# Patient Record
Sex: FEMALE | Race: WHITE | Employment: UNEMPLOYED | URBAN - METROPOLITAN AREA
[De-identification: names, ages, dates, MRNs, and addresses within clinical notes are randomized per-mention and may not be internally consistent; named-entity substitution may affect disease eponyms.]

---

## 2021-11-20 ENCOUNTER — HOSPITAL ENCOUNTER (EMERGENCY)
Age: 31
Discharge: HOME | End: 2021-11-20
Payer: COMMERCIAL

## 2021-11-20 ENCOUNTER — APPOINTMENT (OUTPATIENT)
Dept: GENERAL RADIOLOGY | Age: 31
End: 2021-11-20
Payer: COMMERCIAL

## 2021-11-20 VITALS
HEART RATE: 100 BPM | HEIGHT: 70 IN | DIASTOLIC BLOOD PRESSURE: 93 MMHG | WEIGHT: 224 LBS | TEMPERATURE: 98.3 F | OXYGEN SATURATION: 99 % | BODY MASS INDEX: 32.07 KG/M2 | RESPIRATION RATE: 18 BRPM | SYSTOLIC BLOOD PRESSURE: 134 MMHG

## 2021-11-20 DIAGNOSIS — S82.832A OTHER CLOSED FRACTURE OF DISTAL END OF LEFT FIBULA, INITIAL ENCOUNTER: Primary | ICD-10-CM

## 2021-11-20 PROCEDURE — 29515 APPLICATION SHORT LEG SPLINT: CPT

## 2021-11-20 PROCEDURE — 6370000000 HC RX 637 (ALT 250 FOR IP): Performed by: PHYSICIAN ASSISTANT

## 2021-11-20 PROCEDURE — 73610 X-RAY EXAM OF ANKLE: CPT

## 2021-11-20 PROCEDURE — 99284 EMERGENCY DEPT VISIT MOD MDM: CPT

## 2021-11-20 RX ORDER — BUPROPION HYDROCHLORIDE 150 MG/1
150 TABLET, EXTENDED RELEASE ORAL DAILY
COMMUNITY

## 2021-11-20 RX ORDER — LIDOCAINE HYDROCHLORIDE 10 MG/ML
INJECTION, SOLUTION INFILTRATION; PERINEURAL
Status: DISCONTINUED
Start: 2021-11-20 | End: 2021-11-20 | Stop reason: HOSPADM

## 2021-11-20 RX ORDER — OXYCODONE HYDROCHLORIDE AND ACETAMINOPHEN 5; 325 MG/1; MG/1
1 TABLET ORAL ONCE
Status: COMPLETED | OUTPATIENT
Start: 2021-11-20 | End: 2021-11-20

## 2021-11-20 RX ORDER — OMEPRAZOLE 40 MG/1
40 CAPSULE, DELAYED RELEASE ORAL DAILY
COMMUNITY

## 2021-11-20 RX ORDER — ONDANSETRON 4 MG/1
8 TABLET, ORALLY DISINTEGRATING ORAL ONCE
Status: COMPLETED | OUTPATIENT
Start: 2021-11-20 | End: 2021-11-20

## 2021-11-20 RX ORDER — OXYCODONE HYDROCHLORIDE AND ACETAMINOPHEN 5; 325 MG/1; MG/1
1 TABLET ORAL EVERY 6 HOURS PRN
Qty: 10 TABLET | Refills: 0 | Status: SHIPPED | OUTPATIENT
Start: 2021-11-20 | End: 2021-11-23

## 2021-11-20 RX ORDER — LIDOCAINE HYDROCHLORIDE AND EPINEPHRINE BITARTRATE 20; .01 MG/ML; MG/ML
INJECTION, SOLUTION SUBCUTANEOUS
Status: DISCONTINUED
Start: 2021-11-20 | End: 2021-11-20 | Stop reason: HOSPADM

## 2021-11-20 RX ADMIN — ONDANSETRON 8 MG: 4 TABLET, ORALLY DISINTEGRATING ORAL at 13:49

## 2021-11-20 RX ADMIN — OXYCODONE HYDROCHLORIDE AND ACETAMINOPHEN 1 TABLET: 5; 325 TABLET ORAL at 13:49

## 2021-11-20 ASSESSMENT — PAIN SCALES - GENERAL
PAINLEVEL_OUTOF10: 10
PAINLEVEL_OUTOF10: 10

## 2021-11-20 ASSESSMENT — PAIN DESCRIPTION - ORIENTATION: ORIENTATION: LEFT

## 2021-11-20 ASSESSMENT — PAIN DESCRIPTION - LOCATION: LOCATION: ANKLE

## 2021-11-20 ASSESSMENT — PAIN DESCRIPTION - PAIN TYPE: TYPE: ACUTE PAIN

## 2021-11-20 NOTE — ED NOTES
Splint applied to L ankle with EDWIN Conteh. Patient reports some pain relief after placement.      Pansy Kawasaki, RN  11/20/21 3587

## 2021-11-20 NOTE — ED PROVIDER NOTES
**ADVANCED PRACTICE PROVIDER, I HAVE EVALUATED THIS [facility]  [facility]  EMERGENCY DEPARTMENT ENCOUNTER      Pt Name: Dolores Mercado  CUB:[id number]  Mayelagffrederick 1990  Date of evaluation: 11/20/2021  Provider: Asaf Storey PA-C      Chief Complaint:    Chief Complaint   Patient presents with    Ankle Injury     Tripped over shoes and fell down 2 steps, landing on the left knee. 10 out of 10 left ankle pain that spans the entire ankle and radiates to the knee. Denies previous ortho Hx on affected leg. Nursing Notes, Past Medical Hx, Past Surgical Hx, Social Hx, Allergies, and Family Hx were all reviewed and agreed with or any disagreements were addressed in the HPI.    HPI: (Location, Duration, Timing, Severity, Quality, Assoc Sx, Context, Modifying factors)    Chief Complaint of left ankle pain    This is a  35 y.o. female who presents to the emergency department, she was brought by her friends, she states that she tripped over some shoes and fell onto steps landing on her left knee and admits to severe pain to her left ankle that radiates up to her left knee. Pain is worse with attempted range of motion and better with rest.  She rates her pain level is 10/10. This just happened prior to admission. She denies any other injury. PastMedical/Surgical History:      Diagnosis Date    Anxiety      No past surgical history on file.     Medications:  Discharge Medication List as of 11/20/2021  2:28 PM      CONTINUE these medications which have NOT CHANGED    Details   omeprazole (PRILOSEC) 40 MG delayed release capsule Take 40 mg by mouth dailyHistorical Med      metFORMIN (GLUCOPHAGE) 500 MG tablet Take 500 mg by mouth daily (with breakfast) For PCOSHistorical Med      buPROPion (WELLBUTRIN SR) 150 MG extended release tablet Take 150 mg by mouth dailyHistorical Med               Review of Systems:  (2-9 systems needed)  Review of Systems    \"Positives and Pertinent negatives as per HPI\"    Physical Exam:  Physical Exam  Vitals and nursing note reviewed. Constitutional:       Appearance: Normal appearance. She is well-developed. She is not ill-appearing or diaphoretic. HENT:      Head: Normocephalic and atraumatic. Right Ear: External ear normal.      Left Ear: External ear normal.      Nose: Nose normal.   Eyes:      General:         Right eye: No discharge. Left eye: No discharge. Pulmonary:      Effort: Pulmonary effort is normal. No respiratory distress. Breath sounds: No stridor. Musculoskeletal:      Cervical back: Normal range of motion and neck supple. Left ankle: Swelling and deformity present. Tenderness present over the lateral malleolus, medial malleolus and CF ligament. Decreased range of motion. Anterior drawer test positive. Normal pulse. Legs:    Skin:     General: Skin is warm and dry. Coloration: Skin is not pale. Neurological:      General: No focal deficit present. Mental Status: She is alert and oriented to person, place, and time. Psychiatric:         Mood and Affect: Mood normal.         Behavior: Behavior normal.         MEDICAL DECISION MAKING    Vitals:    Vitals:    11/20/21 1319 11/20/21 1320   BP:  (!) 134/93   Pulse: 100    Resp: 18    Temp: 98.3 °F (36.8 °C)    TempSrc: Oral    SpO2: 99%    Weight: 224 lb (101.6 kg)    Height: 5' 10\" (1.778 m)        LABS:Labs Reviewed - No data to display     Remainder of labs reviewed and were negative at this time or not returned at the time of this note. RADIOLOGY:   Non-plain film images such as CT, Ultrasound and MRI are read by the radiologist. Kathy Borjas PA-C have directly visualized the radiologic plain film image(s) with the below findings:      Interpretation per the Radiologist below, if available at the time of this note:    XR ANKLE LEFT (MIN 3 VIEWS)   Final Result   Distal fibular fracture with minimal displacement.       Widening of the medial aspect of the ankle mortise, possible ligamentous   injury. XR ANKLE LEFT (MIN 3 VIEWS)    Result Date: 11/20/2021  EXAMINATION: 4 XRAY VIEWS OF THE LEFT ANKLE 11/20/2021 2:04 pm COMPARISON: None. HISTORY: ORDERING SYSTEM PROVIDED HISTORY: injury TECHNOLOGIST PROVIDED HISTORY: Reason for exam:->injury Reason for Exam: lateral ankle pain s/p fall Acuity: Acute Type of Exam: Initial FINDINGS: A fiberglass cast is present. There is widening of the medial space of the ankle mortise. There is a spiral fracture of the distal 3rd of the fibular shaft. The distal segment is displaced laterally by 2 mm. Distal fibular fracture with minimal displacement. Widening of the medial aspect of the ankle mortise, possible ligamentous injury. MEDICAL DECISION MAKING / ED COURSE:      PROCEDURES:   Ortho Injury    Date/Time: 11/20/2021 2:59 PM  Performed by: Berkley Mix PA-C  Authorized by: Berkley Mix PA-C   Consent: Verbal consent obtained. Risks and benefits: risks, benefits and alternatives were discussed  Consent given by: patient  Patient identity confirmed: verbally with patient  Injury location: ankle  Location details: left ankle  Injury type: dislocation  Dislocation type: lateral  Pre-procedure neurovascular assessment: neurovascularly intact  Pre-procedure distal perfusion: normal  Pre-procedure neurological function: normal  Pre-procedure range of motion: reduced    Anesthesia:  Local anesthesia used: no    Sedation:  Patient sedated: no    Immobilization: splint  Splint type: Posterior OCL and stirrup. Supplies used: Ortho-Glass  Post-procedure neurovascular assessment: post-procedure neurovascularly intact  Post-procedure distal perfusion: normal  Post-procedure neurological function: normal  Post-procedure range of motion: improved  Patient tolerance: Patient tolerated the procedure well with no immediate complications  Comments:  This patient had a grossly unstable ankle with dislocation, she was immediately splinted manually by myself and then with Ortho-Glass, and then we obtained an x-ray. The patient had a dislocation fracture of left ankle. A posterior and stirrup OCL splint was placed by the myself and the nurse, it was applied appropriately and the patient was neurovascularly intact as observed by myself. Patient was given:  Medications   lidocaine-EPINEPHrine 2 percent-[id number] injection (has no administration in time range)   lidocaine 1 % injection (has no administration in time range)   oxyCODONE-acetaminophen (PERCOCET) 5-325 MG per tablet 1 tablet (1 tablet Oral Given 11/20/21 1349)   ondansetron (ZOFRAN-ODT) disintegrating tablet 8 mg (8 mg Oral Given 11/20/21 1349)       This patient was brought in by private car, she was in a wheelchair, because of the position of where she was we elected to splint this patient prior to x-ray. Reevaluation is reassuring, the patient stated that she felt much better after the splint. No evidence of nervous damage, she did state that she had initially a little bit of numbness which is gone away. The patient tolerated their visit well. I evaluated the patient. The physician was available for consultation as needed. The patient and / or the family were informed of the results of any tests, a time was given to answer questions, a plan was proposed and they agreed with plan. CLINICAL IMPRESSION:  1.  Other closed fracture of distal end of left fibula, initial encounter        DISPOSITION Decision To Discharge 11/20/2021 02:40:04 PM      PATIENT REFERRED TO:  Romana Patrick MD  [address] [address]  [phone]    Call in 2 days  For a recheck in 2-7 days      DISCHARGE MEDICATIONS:  Discharge Medication List as of 11/20/2021  2:28 PM      START taking these medications    Details   oxyCODONE-acetaminophen (PERCOCET) 5-325 MG per tablet Take 1 tablet by mouth every 6 hours as needed for Pain for up to 3 days. , Disp-10 tablet, R-0Print             DISCONTINUED MEDICATIONS:  Discharge Medication List as of 11/20/2021  2:28 PM          Periodic Controlled Substance Monitoring: No signs of potential drug abuse or diversion identified.  Mike Meier PA-C)      (Please note the MDM and HPI sections of this note were completed with a voice recognition program.  Efforts were made to edit the dictations but occasionally words are mis-transcribed.)    Electronically signed, Mike Meier PA-C,          Mike Meier PA-C  11/20/21 1604

## 2021-11-22 ENCOUNTER — HOSPITAL ENCOUNTER (OUTPATIENT)
Age: 31
Setting detail: OUTPATIENT SURGERY
Discharge: HOME | End: 2021-11-22
Attending: ORTHOPAEDIC SURGERY | Admitting: ORTHOPAEDIC SURGERY
Payer: COMMERCIAL

## 2021-11-22 ENCOUNTER — APPOINTMENT (OUTPATIENT)
Dept: GENERAL RADIOLOGY | Age: 31
End: 2021-11-22
Attending: ORTHOPAEDIC SURGERY
Payer: COMMERCIAL

## 2021-11-22 ENCOUNTER — ANESTHESIA (OUTPATIENT)
Dept: OPERATING ROOM | Age: 31
End: 2021-11-22
Payer: COMMERCIAL

## 2021-11-22 ENCOUNTER — ANESTHESIA EVENT (OUTPATIENT)
Dept: OPERATING ROOM | Age: 31
End: 2021-11-22
Payer: COMMERCIAL

## 2021-11-22 VITALS
DIASTOLIC BLOOD PRESSURE: 86 MMHG | OXYGEN SATURATION: 96 % | TEMPERATURE: 98.5 F | HEIGHT: 70 IN | WEIGHT: 224 LBS | SYSTOLIC BLOOD PRESSURE: 137 MMHG | HEART RATE: 89 BPM | RESPIRATION RATE: 14 BRPM | BODY MASS INDEX: 32.07 KG/M2

## 2021-11-22 VITALS
SYSTOLIC BLOOD PRESSURE: 120 MMHG | DIASTOLIC BLOOD PRESSURE: 59 MMHG | TEMPERATURE: 97.7 F | OXYGEN SATURATION: 97 % | RESPIRATION RATE: 2 BRPM

## 2021-11-22 DIAGNOSIS — S82.842A BIMALLEOLAR FRACTURE, LEFT, CLOSED, INITIAL ENCOUNTER: Primary | ICD-10-CM

## 2021-11-22 LAB
GONADOTROPIN, CHORIONIC (HCG) QUANT: <5 MIU/ML
HCG QUALITATIVE: NEGATIVE
SARS-COV-2, NAAT: NOT DETECTED

## 2021-11-22 PROCEDURE — 7100000010 HC PHASE II RECOVERY - FIRST 15 MIN: Performed by: ORTHOPAEDIC SURGERY

## 2021-11-22 PROCEDURE — 3600000004 HC SURGERY LEVEL 4 BASE: Performed by: ORTHOPAEDIC SURGERY

## 2021-11-22 PROCEDURE — 3600000014 HC SURGERY LEVEL 4 ADDTL 15MIN: Performed by: ORTHOPAEDIC SURGERY

## 2021-11-22 PROCEDURE — 6360000002 HC RX W HCPCS: Performed by: NURSE ANESTHETIST, CERTIFIED REGISTERED

## 2021-11-22 PROCEDURE — 6370000000 HC RX 637 (ALT 250 FOR IP): Performed by: STUDENT IN AN ORGANIZED HEALTH CARE EDUCATION/TRAINING PROGRAM

## 2021-11-22 PROCEDURE — 84703 CHORIONIC GONADOTROPIN ASSAY: CPT

## 2021-11-22 PROCEDURE — 7100000000 HC PACU RECOVERY - FIRST 15 MIN: Performed by: ORTHOPAEDIC SURGERY

## 2021-11-22 PROCEDURE — 3700000000 HC ANESTHESIA ATTENDED CARE: Performed by: ORTHOPAEDIC SURGERY

## 2021-11-22 PROCEDURE — 2500000003 HC RX 250 WO HCPCS: Performed by: NURSE ANESTHETIST, CERTIFIED REGISTERED

## 2021-11-22 PROCEDURE — 27829 TREAT LOWER LEG JOINT: CPT | Performed by: NURSE PRACTITIONER

## 2021-11-22 PROCEDURE — 7100000011 HC PHASE II RECOVERY - ADDTL 15 MIN: Performed by: ORTHOPAEDIC SURGERY

## 2021-11-22 PROCEDURE — 6360000002 HC RX W HCPCS: Performed by: STUDENT IN AN ORGANIZED HEALTH CARE EDUCATION/TRAINING PROGRAM

## 2021-11-22 PROCEDURE — 2500000003 HC RX 250 WO HCPCS: Performed by: ORTHOPAEDIC SURGERY

## 2021-11-22 PROCEDURE — 2580000003 HC RX 258: Performed by: ORTHOPAEDIC SURGERY

## 2021-11-22 PROCEDURE — 27848 TREAT ANKLE DISLOCATION: CPT | Performed by: NURSE PRACTITIONER

## 2021-11-22 PROCEDURE — C1713 ANCHOR/SCREW BN/BN,TIS/BN: HCPCS | Performed by: ORTHOPAEDIC SURGERY

## 2021-11-22 PROCEDURE — 3209999900 FLUORO FOR SURGICAL PROCEDURES

## 2021-11-22 PROCEDURE — 27829 TREAT LOWER LEG JOINT: CPT | Performed by: ORTHOPAEDIC SURGERY

## 2021-11-22 PROCEDURE — 2580000003 HC RX 258: Performed by: STUDENT IN AN ORGANIZED HEALTH CARE EDUCATION/TRAINING PROGRAM

## 2021-11-22 PROCEDURE — 27848 TREAT ANKLE DISLOCATION: CPT | Performed by: ORTHOPAEDIC SURGERY

## 2021-11-22 PROCEDURE — 2500000003 HC RX 250 WO HCPCS: Performed by: STUDENT IN AN ORGANIZED HEALTH CARE EDUCATION/TRAINING PROGRAM

## 2021-11-22 PROCEDURE — 87635 SARS-COV-2 COVID-19 AMP PRB: CPT

## 2021-11-22 PROCEDURE — 6360000002 HC RX W HCPCS: Performed by: ORTHOPAEDIC SURGERY

## 2021-11-22 PROCEDURE — 99219 PR INITIAL OBSERVATION CARE/DAY 50 MINUTES: CPT | Performed by: ORTHOPAEDIC SURGERY

## 2021-11-22 PROCEDURE — 3700000001 HC ADD 15 MINUTES (ANESTHESIA): Performed by: ORTHOPAEDIC SURGERY

## 2021-11-22 PROCEDURE — 2709999900 HC NON-CHARGEABLE SUPPLY: Performed by: ORTHOPAEDIC SURGERY

## 2021-11-22 PROCEDURE — 7100000001 HC PACU RECOVERY - ADDTL 15 MIN: Performed by: ORTHOPAEDIC SURGERY

## 2021-11-22 PROCEDURE — 84702 CHORIONIC GONADOTROPIN TEST: CPT

## 2021-11-22 PROCEDURE — 73600 X-RAY EXAM OF ANKLE: CPT

## 2021-11-22 DEVICE — SCREW BNE L14MM DIA3.5MM LNG CORT S STL ST NONCANNULATED: Type: IMPLANTABLE DEVICE | Site: ANKLE | Status: FUNCTIONAL

## 2021-11-22 DEVICE — SCREW BNE L16MM DIA2.7MM LNG CORT FT ANK S STL ST: Type: IMPLANTABLE DEVICE | Site: ANKLE | Status: FUNCTIONAL

## 2021-11-22 DEVICE — IMPLANTABLE DEVICE
Type: IMPLANTABLE DEVICE | Site: ANKLE | Status: NON-FUNCTIONAL
Removed: 2022-04-07

## 2021-11-22 DEVICE — SCREW BNE UNIV L12MM DIA3.5MM CORT S STL ST NONCANNULATED: Type: IMPLANTABLE DEVICE | Site: ANKLE | Status: FUNCTIONAL

## 2021-11-22 DEVICE — PLATE BNE L97MM 8 H BILAT S STL 1/3 TBLR FOR 3.5MM SCR: Type: IMPLANTABLE DEVICE | Site: ANKLE | Status: FUNCTIONAL

## 2021-11-22 DEVICE — SCREW BNE L14MM DIA2.7MM SM HEX HD DIA2.5MM CORT S STL ST: Type: IMPLANTABLE DEVICE | Site: ANKLE | Status: FUNCTIONAL

## 2021-11-22 RX ORDER — LIDOCAINE HYDROCHLORIDE 20 MG/ML
INJECTION, SOLUTION EPIDURAL; INFILTRATION; INTRACAUDAL; PERINEURAL PRN
Status: DISCONTINUED | OUTPATIENT
Start: 2021-11-22 | End: 2021-11-22 | Stop reason: SDUPTHER

## 2021-11-22 RX ORDER — PROPOFOL 10 MG/ML
INJECTION, EMULSION INTRAVENOUS PRN
Status: DISCONTINUED | OUTPATIENT
Start: 2021-11-22 | End: 2021-11-22 | Stop reason: SDUPTHER

## 2021-11-22 RX ORDER — MAGNESIUM HYDROXIDE 1200 MG/15ML
LIQUID ORAL CONTINUOUS PRN
Status: COMPLETED | OUTPATIENT
Start: 2021-11-22 | End: 2021-11-22

## 2021-11-22 RX ORDER — LABETALOL HYDROCHLORIDE 5 MG/ML
5 INJECTION, SOLUTION INTRAVENOUS EVERY 10 MIN PRN
Status: DISCONTINUED | OUTPATIENT
Start: 2021-11-22 | End: 2021-11-22 | Stop reason: HOSPADM

## 2021-11-22 RX ORDER — DIPHENHYDRAMINE HYDROCHLORIDE 50 MG/ML
12.5 INJECTION INTRAMUSCULAR; INTRAVENOUS
Status: COMPLETED | OUTPATIENT
Start: 2021-11-22 | End: 2021-11-22

## 2021-11-22 RX ORDER — SCOLOPAMINE TRANSDERMAL SYSTEM 1 MG/1
1 PATCH, EXTENDED RELEASE TRANSDERMAL ONCE
Status: DISCONTINUED | OUTPATIENT
Start: 2021-11-22 | End: 2021-11-22 | Stop reason: HOSPADM

## 2021-11-22 RX ORDER — HYDRALAZINE HYDROCHLORIDE 20 MG/ML
5 INJECTION INTRAMUSCULAR; INTRAVENOUS EVERY 10 MIN PRN
Status: DISCONTINUED | OUTPATIENT
Start: 2021-11-22 | End: 2021-11-22 | Stop reason: HOSPADM

## 2021-11-22 RX ORDER — CEPHALEXIN 500 MG/1
500 CAPSULE ORAL 4 TIMES DAILY
Qty: 20 CAPSULE | Refills: 0 | Status: SHIPPED | OUTPATIENT
Start: 2021-11-22 | End: 2021-11-27

## 2021-11-22 RX ORDER — BUPIVACAINE HYDROCHLORIDE 5 MG/ML
INJECTION, SOLUTION EPIDURAL; INTRACAUDAL
Status: COMPLETED | OUTPATIENT
Start: 2021-11-22 | End: 2021-11-22

## 2021-11-22 RX ORDER — HYDROCODONE BITARTRATE AND ACETAMINOPHEN 5; 325 MG/1; MG/1
1 TABLET ORAL PRN
Status: DISCONTINUED | OUTPATIENT
Start: 2021-11-22 | End: 2021-11-22 | Stop reason: HOSPADM

## 2021-11-22 RX ORDER — KETOROLAC TROMETHAMINE 30 MG/ML
15 INJECTION, SOLUTION INTRAMUSCULAR; INTRAVENOUS ONCE
Status: DISCONTINUED | OUTPATIENT
Start: 2021-11-22 | End: 2021-11-22

## 2021-11-22 RX ORDER — SODIUM CHLORIDE 9 MG/ML
INJECTION, SOLUTION INTRAVENOUS CONTINUOUS
Status: DISCONTINUED | OUTPATIENT
Start: 2021-11-22 | End: 2021-11-22 | Stop reason: HOSPADM

## 2021-11-22 RX ORDER — MIDAZOLAM HYDROCHLORIDE 1 MG/ML
INJECTION INTRAMUSCULAR; INTRAVENOUS PRN
Status: DISCONTINUED | OUTPATIENT
Start: 2021-11-22 | End: 2021-11-22 | Stop reason: SDUPTHER

## 2021-11-22 RX ORDER — PROCHLORPERAZINE EDISYLATE 5 MG/ML
5 INJECTION INTRAMUSCULAR; INTRAVENOUS
Status: DISCONTINUED | OUTPATIENT
Start: 2021-11-22 | End: 2021-11-22 | Stop reason: HOSPADM

## 2021-11-22 RX ORDER — KETOROLAC TROMETHAMINE 30 MG/ML
15 INJECTION, SOLUTION INTRAMUSCULAR; INTRAVENOUS ONCE
Status: COMPLETED | OUTPATIENT
Start: 2021-11-22 | End: 2021-11-22

## 2021-11-22 RX ORDER — SCOLOPAMINE TRANSDERMAL SYSTEM 1 MG/1
1 PATCH, EXTENDED RELEASE TRANSDERMAL ONCE
Status: DISCONTINUED | OUTPATIENT
Start: 2021-11-22 | End: 2021-11-22 | Stop reason: SDUPTHER

## 2021-11-22 RX ORDER — ONDANSETRON 2 MG/ML
INJECTION INTRAMUSCULAR; INTRAVENOUS PRN
Status: DISCONTINUED | OUTPATIENT
Start: 2021-11-22 | End: 2021-11-22 | Stop reason: SDUPTHER

## 2021-11-22 RX ORDER — HYDROCODONE BITARTRATE AND ACETAMINOPHEN 5; 325 MG/1; MG/1
2 TABLET ORAL PRN
Status: DISCONTINUED | OUTPATIENT
Start: 2021-11-22 | End: 2021-11-22 | Stop reason: HOSPADM

## 2021-11-22 RX ORDER — SODIUM CHLORIDE 0.9 % (FLUSH) 0.9 %
5-40 SYRINGE (ML) INJECTION EVERY 12 HOURS SCHEDULED
Status: DISCONTINUED | OUTPATIENT
Start: 2021-11-22 | End: 2021-11-22 | Stop reason: HOSPADM

## 2021-11-22 RX ORDER — DEXAMETHASONE SODIUM PHOSPHATE 4 MG/ML
INJECTION, SOLUTION INTRA-ARTICULAR; INTRALESIONAL; INTRAMUSCULAR; INTRAVENOUS; SOFT TISSUE PRN
Status: DISCONTINUED | OUTPATIENT
Start: 2021-11-22 | End: 2021-11-22 | Stop reason: SDUPTHER

## 2021-11-22 RX ORDER — SODIUM CHLORIDE 9 MG/ML
25 INJECTION, SOLUTION INTRAVENOUS PRN
Status: DISCONTINUED | OUTPATIENT
Start: 2021-11-22 | End: 2021-11-22 | Stop reason: HOSPADM

## 2021-11-22 RX ORDER — APREPITANT 40 MG/1
40 CAPSULE ORAL ONCE
Status: COMPLETED | OUTPATIENT
Start: 2021-11-22 | End: 2021-11-22

## 2021-11-22 RX ORDER — FENTANYL CITRATE 50 UG/ML
INJECTION, SOLUTION INTRAMUSCULAR; INTRAVENOUS PRN
Status: DISCONTINUED | OUTPATIENT
Start: 2021-11-22 | End: 2021-11-22 | Stop reason: SDUPTHER

## 2021-11-22 RX ORDER — SODIUM CHLORIDE 0.9 % (FLUSH) 0.9 %
5-40 SYRINGE (ML) INJECTION PRN
Status: DISCONTINUED | OUTPATIENT
Start: 2021-11-22 | End: 2021-11-22 | Stop reason: HOSPADM

## 2021-11-22 RX ORDER — HYDROCODONE BITARTRATE AND ACETAMINOPHEN 5; 325 MG/1; MG/1
1 TABLET ORAL EVERY 6 HOURS PRN
Qty: 20 TABLET | Refills: 0 | Status: SHIPPED | OUTPATIENT
Start: 2021-11-22 | End: 2021-11-27

## 2021-11-22 RX ORDER — ONDANSETRON 2 MG/ML
4 INJECTION INTRAMUSCULAR; INTRAVENOUS
Status: COMPLETED | OUTPATIENT
Start: 2021-11-22 | End: 2021-11-22

## 2021-11-22 RX ADMIN — HYDROMORPHONE HYDROCHLORIDE 1 MG: 1 INJECTION, SOLUTION INTRAMUSCULAR; INTRAVENOUS; SUBCUTANEOUS at 13:17

## 2021-11-22 RX ADMIN — HYDROMORPHONE HYDROCHLORIDE 0.5 MG: 1 INJECTION, SOLUTION INTRAMUSCULAR; INTRAVENOUS; SUBCUTANEOUS at 14:32

## 2021-11-22 RX ADMIN — LABETALOL HYDROCHLORIDE 5 MG: 5 INJECTION INTRAVENOUS at 14:56

## 2021-11-22 RX ADMIN — SODIUM CHLORIDE: 9 INJECTION, SOLUTION INTRAVENOUS at 12:22

## 2021-11-22 RX ADMIN — ONDANSETRON 4 MG: 2 INJECTION INTRAMUSCULAR; INTRAVENOUS at 14:35

## 2021-11-22 RX ADMIN — ONDANSETRON 4 MG: 2 INJECTION INTRAMUSCULAR; INTRAVENOUS at 13:06

## 2021-11-22 RX ADMIN — DIPHENHYDRAMINE HYDROCHLORIDE 12.5 MG: 50 INJECTION, SOLUTION INTRAMUSCULAR; INTRAVENOUS at 15:54

## 2021-11-22 RX ADMIN — HYDROMORPHONE HYDROCHLORIDE 0.5 MG: 1 INJECTION, SOLUTION INTRAMUSCULAR; INTRAVENOUS; SUBCUTANEOUS at 14:59

## 2021-11-22 RX ADMIN — MIDAZOLAM 2 MG: 1 INJECTION INTRAMUSCULAR; INTRAVENOUS at 13:02

## 2021-11-22 RX ADMIN — FENTANYL CITRATE 50 MCG: 50 INJECTION INTRAMUSCULAR; INTRAVENOUS at 13:06

## 2021-11-22 RX ADMIN — CEFAZOLIN 2000 MG: 10 INJECTION, POWDER, FOR SOLUTION INTRAVENOUS at 13:02

## 2021-11-22 RX ADMIN — PROPOFOL 200 MG: 10 INJECTION, EMULSION INTRAVENOUS at 13:06

## 2021-11-22 RX ADMIN — APREPITANT 40 MG: 40 CAPSULE ORAL at 12:20

## 2021-11-22 RX ADMIN — FENTANYL CITRATE 50 MCG: 50 INJECTION INTRAMUSCULAR; INTRAVENOUS at 13:15

## 2021-11-22 RX ADMIN — HYDROMORPHONE HYDROCHLORIDE 0.5 MG: 1 INJECTION, SOLUTION INTRAMUSCULAR; INTRAVENOUS; SUBCUTANEOUS at 14:49

## 2021-11-22 RX ADMIN — DEXAMETHASONE SODIUM PHOSPHATE 10 MG: 4 INJECTION, SOLUTION INTRAMUSCULAR; INTRAVENOUS at 13:06

## 2021-11-22 RX ADMIN — KETOROLAC TROMETHAMINE 15 MG: 30 INJECTION, SOLUTION INTRAMUSCULAR at 15:26

## 2021-11-22 RX ADMIN — LIDOCAINE HYDROCHLORIDE 100 MG: 20 INJECTION, SOLUTION EPIDURAL; INFILTRATION; INTRACAUDAL; PERINEURAL at 13:06

## 2021-11-22 RX ADMIN — PROPOFOL 50 MG: 10 INJECTION, EMULSION INTRAVENOUS at 13:09

## 2021-11-22 ASSESSMENT — PULMONARY FUNCTION TESTS
PIF_VALUE: 10
PIF_VALUE: 9
PIF_VALUE: 10
PIF_VALUE: 9
PIF_VALUE: 11
PIF_VALUE: 24
PIF_VALUE: 0
PIF_VALUE: 10
PIF_VALUE: 11
PIF_VALUE: 9
PIF_VALUE: 10
PIF_VALUE: 11
PIF_VALUE: 10
PIF_VALUE: 10
PIF_VALUE: 9
PIF_VALUE: 10
PIF_VALUE: 11
PIF_VALUE: 10
PIF_VALUE: 10
PIF_VALUE: 2
PIF_VALUE: 1
PIF_VALUE: 10
PIF_VALUE: 10
PIF_VALUE: 9
PIF_VALUE: 8
PIF_VALUE: 1
PIF_VALUE: 10
PIF_VALUE: 10
PIF_VALUE: 1
PIF_VALUE: 5
PIF_VALUE: 10
PIF_VALUE: 4
PIF_VALUE: 10
PIF_VALUE: 8
PIF_VALUE: 10
PIF_VALUE: 9
PIF_VALUE: 10
PIF_VALUE: 5
PIF_VALUE: 4
PIF_VALUE: 2
PIF_VALUE: 10
PIF_VALUE: 10
PIF_VALUE: 4
PIF_VALUE: 10
PIF_VALUE: 9
PIF_VALUE: 0
PIF_VALUE: 10
PIF_VALUE: 4
PIF_VALUE: 10
PIF_VALUE: 10
PIF_VALUE: 6
PIF_VALUE: 10
PIF_VALUE: 4
PIF_VALUE: 1
PIF_VALUE: 4
PIF_VALUE: 9
PIF_VALUE: 10

## 2021-11-22 ASSESSMENT — PAIN DESCRIPTION - ORIENTATION
ORIENTATION: LEFT

## 2021-11-22 ASSESSMENT — PAIN DESCRIPTION - FREQUENCY
FREQUENCY: CONTINUOUS

## 2021-11-22 ASSESSMENT — PAIN DESCRIPTION - PROGRESSION
CLINICAL_PROGRESSION: GRADUALLY IMPROVING
CLINICAL_PROGRESSION: GRADUALLY IMPROVING
CLINICAL_PROGRESSION: NOT CHANGED

## 2021-11-22 ASSESSMENT — PAIN SCALES - GENERAL
PAINLEVEL_OUTOF10: 8
PAINLEVEL_OUTOF10: 6
PAINLEVEL_OUTOF10: 8
PAINLEVEL_OUTOF10: 4
PAINLEVEL_OUTOF10: 8
PAINLEVEL_OUTOF10: 4

## 2021-11-22 ASSESSMENT — PAIN DESCRIPTION - ONSET
ONSET: ON-GOING

## 2021-11-22 ASSESSMENT — PAIN DESCRIPTION - DESCRIPTORS
DESCRIPTORS: BURNING
DESCRIPTORS: ACHING;BURNING
DESCRIPTORS: BURNING

## 2021-11-22 ASSESSMENT — PAIN DESCRIPTION - LOCATION
LOCATION: ANKLE
LOCATION: FOOT
LOCATION: FOOT
LOCATION: ANKLE
LOCATION: FOOT

## 2021-11-22 ASSESSMENT — LIFESTYLE VARIABLES: SMOKING_STATUS: 0

## 2021-11-22 ASSESSMENT — PAIN DESCRIPTION - PAIN TYPE
TYPE: ACUTE PAIN;SURGICAL PAIN
TYPE: ACUTE PAIN;SURGICAL PAIN
TYPE: SURGICAL PAIN

## 2021-11-22 ASSESSMENT — PAIN - FUNCTIONAL ASSESSMENT
PAIN_FUNCTIONAL_ASSESSMENT: PREVENTS OR INTERFERES SOME ACTIVE ACTIVITIES AND ADLS
PAIN_FUNCTIONAL_ASSESSMENT: 0-10

## 2021-11-22 NOTE — BRIEF OP NOTE
Brief Postoperative Note      Patient: Cherri Hermosillo  YOB: 1990  MRN: [id number]    Date of Procedure: 11/22/2021    Pre-Op Diagnosis: LEFT ANKLE FRACTURE DISLOCATION WITH SYNDESMOSIS DISRUPTION. Post-Op Diagnosis: Same       Procedure(s):  OPEN REDUCTION INTERNAL FIXATION LEFT ANKLE FRACTURE DISLOCATION WITH SYNDESMOSIS REPAIR. Surgeon(s):  Gracie Arellano MD    Assistant: Gudelia Dennis CNP    Anesthesia: General    Estimated Blood Loss (mL): Minimal    Complications: None    Specimens:   * No specimens in log *    Implants:  Implant Name Type Inv. Item Serial No.  Lot No. LRB No. Used Action   SCREW BNE L14MM DIA2.7MM SM HEX HD DIA2.5MM ZOILA S STL ST - [id number]  SCREW BNE L14MM DIA2.7MM SM HEX HD DIA2.5MM ZOILA S STL ST [id number] [employer]HA [employer]WD  Left 2 Implanted   SCREW BNE L16MM DIA2. 7MM LNG ZOILA FT ANK S STL ST - [id number]  SCREW BNE L16MM DIA2. 7MM LNG ZOILA FT ANK S STL ST [id number] [employer]HA [employer]WD  Left 1 Implanted   SCREW BNE UNIV L12MM DIA3.5MM ZOILA S STL ST NONCANNULATED - [id number]  SCREW BNE UNIV L12MM DIA3.5MM ZOILA S STL ST NONCANNULATED [id number] [employer]WD  Left 3 Implanted   SCREW BNE L14MM DIA3. 5MM LNG ZOILA S STL ST NONCANNULATED - [id number]  SCREW BNE L14MM DIA3. 5MM LNG ZOILA S STL ST NONCANNULATED [id number] [employer]-WD  Left 3 Implanted   PLATE BNE O25PD 8 H BILAT S STL 1/3 TBLR FOR 3.5MM SCR - [id number]  PLATE BNE B75OE 8 H BILAT S STL 1/3 TBLR FOR 3.5MM SCR [id number] [employer]WD  Left 1 Implanted   SCREW BNE L45MM DIA3. 5MM SM HEX HD DIA2.5MM ZOILA S STL ST - [id number]  SCREW BNE L45MM DIA3. 5MM SM HEX HD DIA2.5MM ZOILA S STL ST [id number] [employer]WD  Left 1 Implanted         Drains: * No LDAs found *    Findings: Same    Electronically signed by Gracie Arellano MD on 11/22/2021 at 5:44 PM

## 2021-11-22 NOTE — PROGRESS NOTES
To pacu from OR. PT asleep with oral airway in place. Dressing to left lower leg dry and intact. Popliteal pulse palpable. Toes to left foot warm with brisk cap refill. IV infusing. Monitor in sinus rhythm.

## 2021-11-22 NOTE — H&P
Preoperative H&P Update    The patient's History and Physical in the medical record from 11/22/2021 was reviewed by me today. Past Medical History:   Diagnosis Date    Anxiety      No past surgical history on file. No current facility-administered medications on file prior to encounter. Current Outpatient Medications on File Prior to Encounter   Medication Sig Dispense Refill    omeprazole (PRILOSEC) 40 MG delayed release capsule Take 40 mg by mouth daily      metFORMIN (GLUCOPHAGE) 500 MG tablet Take 500 mg by mouth daily (with breakfast) For PCOS      buPROPion (WELLBUTRIN SR) 150 MG extended release tablet Take 150 mg by mouth daily      oxyCODONE-acetaminophen (PERCOCET) 5-325 MG per tablet Take 1 tablet by mouth every 6 hours as needed for Pain for up to 3 days. 10 tablet 0       No Known Allergies   I reviewed the HPI, medications, allergies, reason for surgery, diagnosis and treatment plan and there has been no change. The patient was evaluated by me today. Physical exam findings for this update include: There were no vitals filed for this visit. Airway is intact  Chest: chest clear, no wheezing, rales, normal symmetric air entry, no tachypnea, retractions or cyanosis  Heart: regular rate and rhythm ; heart sounds normal  Findings on exam of the body region where surgery is to be performed include:  Left ankle bimalleolar fracture.     Electronically signed by Michel Wayne MD on 11/22/2021 at 11:10 AM

## 2021-11-22 NOTE — PROGRESS NOTES
Pt asleep. Wakes easily. States pain 4/10 and tolerable and that itching has eased. Waiting for room in phase 2.

## 2021-11-22 NOTE — CONSULTS
[facility] Orthopedic Surgery  Consult Note         This patient is seen in consultation at the request of Rocio Jeong PA-C    Reason for Consult:  Left ankle pain / fracture dislocation with syndesmosis disruption. CHIEF COMPLAINT:  Left ankle pain / fall. History Obtained From:  patient, spouse, electronic medical record    HISTORY OF PRESENT ILLNESS:    Ms. Mary Foster 35 y.o.  female seen today for consultation and evaluation of a left ankle injury which occurred when she was walking down a step and tripped over a shoe at her friend's house. She was first seen and evaluated in [facility], where she was reduced then x-rayed, splinted and asked to f/u with me. She is complaining of medial & lateral ankle pain and swelling. This is better with elevation and worse with bearing any wt. The pain is sharp and not radiating. No numbness or tingling sensation. Alleviating factors: elevation and rest. No other complaint. Past Medical History:        Diagnosis Date    Anxiety        Past Surgical History:        Procedure Laterality Date    ANKLE FRACTURE SURGERY Left 11/22/2021    OPEN REDUCTION INTERNAL FIXATION LEFT ANKLE performed by Leopold Grant, MD at [place] [address]       Medications prior to admission:   Prior to Admission medications    Medication Sig Start Date End Date Taking? Authorizing Provider   omeprazole (PRILOSEC) 40 MG delayed release capsule Take 40 mg by mouth daily    Historical Provider, MD   metFORMIN (GLUCOPHAGE) 500 MG tablet Take 500 mg by mouth daily (with breakfast) For PCOS    Historical Provider, MD   buPROPion (WELLBUTRIN SR) 150 MG extended release tablet Take 150 mg by mouth daily    Historical Provider, MD   oxyCODONE-acetaminophen (PERCOCET) 5-325 MG per tablet Take 1 tablet by mouth every 6 hours as needed for Pain for up to 3 days. 11/20/21 11/23/21  Rocio Jeong PA-C       Current Medications:   No current facility-administered medications for this encounter.     Allergies: CONSTITUTIONAL: Denies unexplained weight loss, fevers, chills or fatigue  NEUROLOGICAL: Denies unsteady gait or progressive weakness    PSYCHOLOGICAL: Denies anxiety, depression   SKIN: Denies skin changes, delayed healing, rash, itching   HEMATOLOGIC: Denies easy bleeding or bruising  ENDOCRINE: Denies excessive thirst, urination, heat/cold  RESPIRATORY: Denies current dyspnea, cough  CARDIOVASCULAR: Negative for chest pain at this time. EYES: Negative for photophobia and visual disturbance. ENT:  Negative for rhinorrhea, epistaxis, sore throat, or hearing loss. GI: Denies nausea, vomiting, diarrhea   : Denies bowel or bladder issues   MUSCULOSKELETAL: Left ankle pain. All other ROS reviewed in chart or with patient or family and are grossly negative. PHYSICAL EXAMINATION:  Ms. Mary Foster is a very pleasant 35 y.o. female who seen today in no acute distress, awake, alert, and oriented. She is well nourished and groomed. Patient with normal affect. There is no height or weight on file to calculate BMI. . Skin warm and dry. Resting respiratory rate is 16. Resp deep and easy. Pulse is with regular rate and rhythm    There were no vitals taken for this visit. Airway is intact  Chest: chest clear, no wheezing, rales, normal symmetric air entry, no tachypnea, retractions or cyanosis  Heart: regular rate and rhythm ; heart sounds normal   Hearing intact, pupil equal and reactive bilateral  Lymphatics; No groin or axillary enlarged lymph nodes. Neck; No swelling  Abdomen; soft, non distended. MUSCULOSKELETAL:   Examination of both ankles showing a decreased range of motion of the left ankle compare to the other side. There is moderate swelling that can be seen, as well as ecchymosis. She has intact sensation and good pedal pulses. She has significant tenderness on deep palpation over the medial & lateral malleolus of the left ankle.     NEUROLOGIC:   Sensory:    Touch: Right Upper Extremity:  normal                   Left Upper Extremity:  normal                  Right Lower Extremity:  normal                  Left Lower Extremity:  normal        DATA:    CBC: No results found for: WBC, RBC, HGB, HCT, MCV, MCH, MCHC, RDW, PLT, MPV  WBC:  No results found for: WBC  PT/INR:  No results found for: PROTIME, INR  PTT:  No results found for: APTT[APTT    IMAGING: Xrays, 3 views of the left ankle dated 11/20/2021 from [facility],  were reviewed, and showed a moderately displaced distal fibula fracture dislocation. IMPRESSION: Left ankle pain / fracture dislocation with syndesmosis disruption. PLAN:  I discussed with Richard Rodriguez the overall alignment of the fracture and treatment options including both surgical and non-surgical treatment, and that my recommendation is an open reduction and internal fixation given the amount of displacement and comminution of the fracture. I discussed the risks and benefits of surgery with the patient, including but not limited to infection, bleeding, pain, injury to nerves or blood vessels failure of the surgery and need for additional surgery. All the patient's questions were answered. We discussed an expected post-operative course. She  is understanding of this and wishes to proceed. Thank you very much for the kind consultation and allowing me to participate in this patient's care. I will continue to keep you apprised of her progress.          Kaitlin Reed MD   11/22/2021  11:09 AM

## 2021-11-22 NOTE — PROGRESS NOTES
Pt tolerates sitting up and PO intake. Written and verbal discharge instructions along with prescriptions given to pt and significant other. Verbalized understanding.

## 2021-11-22 NOTE — ANESTHESIA PRE PROCEDURE
Department of Anesthesiology  Preprocedure Note       Name:  Ela Fairbanks   Age:  35 y.o.  :  1990                                          MRN:  [id number]         Date:  2021      Surgeon: Thomas Tavarez):  Michelle Rico MD    Procedure: Procedure(s):  OPEN REDUCTION INTERNAL FIXATION LEFT ANKLE    Medications prior to admission:   Prior to Admission medications    Medication Sig Start Date End Date Taking? Authorizing Provider   HYDROcodone-acetaminophen (NORCO) 5-325 MG per tablet Take 1 tablet by mouth every 6 hours as needed for Pain for up to 5 days. Intended supply: 3 days. Take lowest dose possible to manage pain 21 Yes Michelle Rico MD   cephALEXin (KEFLEX) 500 MG capsule Take 1 capsule by mouth 4 times daily for 5 days 21 Yes Michelle Rico MD   omeprazole (PRILOSEC) 40 MG delayed release capsule Take 40 mg by mouth daily    Historical Provider, MD   metFORMIN (GLUCOPHAGE) 500 MG tablet Take 500 mg by mouth daily (with breakfast) For PCOS    Historical Provider, MD   buPROPion (WELLBUTRIN SR) 150 MG extended release tablet Take 150 mg by mouth daily    Historical Provider, MD   oxyCODONE-acetaminophen (PERCOCET) 5-325 MG per tablet Take 1 tablet by mouth every 6 hours as needed for Pain for up to 3 days. 21  Pati Robles PA-C       Current medications:    No current facility-administered medications for this encounter. Allergies:  No Known Allergies    Problem List:  There is no problem list on file for this patient. Past Medical History:        Diagnosis Date    Anxiety        Past Surgical History:  No past surgical history on file. Social History:    Social History     Tobacco Use    Smoking status: Not on file    Smokeless tobacco: Not on file   Substance Use Topics    Alcohol use: Not on file                                Counseling given: Not Answered      Vital Signs (Current):  There were no vitals filed for this visit.                                           BP Readings from Last 3 Encounters:   11/20/21 (!) 134/93       NPO Status:                                                                                 BMI:   Wt Readings from Last 3 Encounters:   11/20/21 224 lb (101.6 kg)     There is no height or weight on file to calculate BMI.    CBC: No results found for: WBC, RBC, HGB, HCT, MCV, RDW, PLT    CMP: No results found for: NA, K, CL, CO2, BUN, CREATININE, GFRAA, AGRATIO, LABGLOM, GLUCOSE, PROT, CALCIUM, BILITOT, ALKPHOS, AST, ALT    POC Tests: No results for input(s): POCGLU, POCNA, POCK, POCCL, POCBUN, POCHEMO, POCHCT in the last 72 hours. Coags: No results found for: PROTIME, INR, APTT    HCG (If Applicable): No results found for: PREGTESTUR, PREGSERUM, HCG, HCGQUANT     ABGs: No results found for: PHART, PO2ART, RXI6LHX, ZFG1VUY, BEART, K7SWOXYZ     Type & Screen (If Applicable):  No results found for: LABABO, LABRH    Drug/Infectious Status (If Applicable):  No results found for: HIV, HEPCAB    COVID-19 Screening (If Applicable): No results found for: COVID19        Anesthesia Evaluation     History of anesthetic complications: no anesthetic history. Airway: Mallampati: II  TM distance: >3 FB   Neck ROM: full  Comment: Tonsils noted to be enlarged, Ms. Terrie Dacosta denies URI symptoms  Mouth opening: > = 3 FB Dental: normal exam     Comment: No loose teeth    Pulmonary: breath sounds clear to auscultation      (-) COPD, asthma, sleep apnea, rhonchi, wheezes, rales and not a current smoker  Recent URI: denies.                            Cardiovascular:  Exercise tolerance: good (>4 METS),       (-) hypertension, dysrhythmias, orthopnea,  PIERRE, weak pulses and peripheral edema      Rhythm: regular  Rate: normal                    Neuro/Psych:   (+) depression/anxiety    (-) seizures, TIA and CVA            ROS comment: Chronic back pain: gabapentin + robaxin GI/Hepatic/Renal:        (-) liver disease, no renal disease and no morbid obesity (BMI: 32.14)       Endo/Other:    (+) Diabetes (HgbA1c: 5.4%)Type II DM, well controlled, , .    (-) hypothyroidism, hyperthyroidism, blood dyscrasia                ROS comment: XR Left Ankle:  Distal fibular fracture with minimal displacement. Widening of the medial aspect of the ankle mortise, possible ligamentous injury. Abdominal:         (-) obese Abdomen: soft. Vascular: Other Findings: Patient is a native of [place]. EMR incorrectly indicates that she speaks [place] Anesthesia Plan      general     ASA 2     (Native [place] speaker. Verbalizes understanding in [place] cardenas. NPO appropriate, reports nausea with opiates. Aggressive PONV prophylaxis, scopolamine & emend ordered in preop.)  Induction: intravenous. MIPS: Postoperative opioids intended and Prophylactic antiemetics administered. Anesthetic plan and risks discussed with patient and spouse. Plan discussed with CRNA. This pre-anesthesia assessment may be used as a history and physical.    DOS STAFF ADDENDUM:    Pt seen and examined, chart reviewed (including anesthesia, drug and allergy history). No interval changes to history and physical examination. Anesthetic plan, risks, benefits, alternatives, and personnel involved discussed with patient. Patient verbalized an understanding and agrees to proceed.       Prateek Bella MD  November 22, 2021  11:33 AM

## 2021-11-23 NOTE — OP NOTE
[address] [place] [address] Bryon Awad                                 OPERATIVE REPORT    PATIENT NAME: ALAYNA Storm                     :        1990  MED REC NO:   [id number]                          ROOM:  ACCOUNT NO:   [de-identified]                           ADMIT DATE: 2021  PROVIDER:     Kizzy Mi MD       DATE OF PROCEDURE:  2021    PRIMARY CARE PROVIDER:  Leisa Begum MD    PREOPERATIVE DIAGNOSES:  1. Left ankle fracture dislocation. 2.  Left ankle distal tibiofibular syndesmosis disruption. POSTOPERATIVE DIAGNOSES:  1. Left ankle fracture dislocation. 2.  Left ankle distal tibiofibular syndesmosis disruption. OPERATION PERFORMED:  1. Open treatment of left ankle fracture dislocation with open  reduction and internal fixation. 2.  Open treatment of left ankle distal tibiofibular syndesmosis  disruption with syndesmosis screw. SURGEON:  Kizzy Mi MD    ASSISTANT:  Francisco Steiner CNP    ANESTHESIA:  General anesthesia. ESTIMATED BLOOD LOSS:  Minimal.    COMPLICATIONS:  None. TOURNIQUET:  Left upper thigh, 350 mmHg. IMPLANT USED:  1. Sunny eight-hole one-third tubular locking plate. 2.  Sunny 2.7 lag screw x3.  3.  Sunny 3.5 cortical screw for syndesmosis repair. INDICATIONS:  This is a 52-year-old female who was in a friend's house  helping moving and coming down steps, sustained a fall after she tripped  over shoes with a twisting injury to her left ankle. She immediately  had significant pain and deformity of her left ankle. She was brought  by her friend to [facility] ER where she underwent closed reduction  and splinting. All risks, benefits and alternatives were discussed with  the patient and she agreed to proceed with surgical fixation. Given the patient's Body mass index is 32.14 kg/m².  and comminuted fracture dislocation added significant challenge to the procedure. It required significant physical and mental effort. It required 80% more time for such procedure. OPERATIVE PROCEDURE:  The patient's left ankle was marked. She received  2 gm of Ancef IV preoperatively. The patient was then brought to the  operating room, underwent general anesthesia. A well-padded tourniquet  was placed, left upper thigh. The left lower extremity was then prepped  and draped in regular sterile routine fashion. A time-out was called to  confirm the patient's name, site and procedure. Esmarch was used for exsanguination. Tourniquet was inflated to 350  mmHg. A lateral incision was made over the distal fibula. Careful  dissection was performed identifying and protecting the superficial  peroneal nerve. We then exposed the fracture, found to be markedly  displaced, causing the fracture dislocation. The fracture was found to  be comminuted with posterior butterfly piece. It was significantly  challenging given the instability of the fracture and multiple pieces. We carefully were able to stabilize the butterfly piece to the proximal  shaft and then stabilized that to the distal part of the fibula. While  maintaining the anatomic reduction, we placed three lag screws and those  were 2.7 lag screws. That stabilized the fracture provisionally. We  then elected to use an eight-char one-third tubular locking plate, which  was placed in appropriate position. We then placed multiple nonlocking  screws proximally and distally. At this point, we went ahead and placed  total of three screws proximally and three screws distally. After we  stabilized the fibula, the fracture dislocation was now anatomically in  place. We then turned attention towards the syndesmosis repair. We reduced the  syndesmosis manually and then while maintaining the reduction, we placed  a 3.5 cortical screw across the syndesmosis for syndesmosis repair.    Overall, we were very satisfied with the anatomic reduction and position  of all the screws and the ankle mortise was now symmetrical and  reduction of the ankle fracture dislocation. At this point, we let the tourniquet down and hemostasis was secured. We irrigated the incision copiously with normal saline mixed with  gentamicin. We closed the deep layer with 2-0 Vicryl, subcu with 2-0  and 3-0 Vicryl, and the skin with 4-0 Monocryl. Steri-Strips were then  applied. Dressing was then applied in the form of Xeroform, 4x4,  sterile Webril, and a splint was applied. The patient tolerated the procedure well, was taken to the recovery in  stable condition. Radha Martinez CNP was 1st Assist given the nature of the procedure that needed advanced assistance. POSTOPERATIVE PLAN:  The patient will be discharged home. She will be  nonweightbearing for the first 2 weeks after that she will continue to  be nonweightbearing but may be toe-touch weightbearing in a boot for the  following four to six weeks. The patient will need staged procedure  with syndesmosis screw removal about four to five months  postoperatively.         Brittney Michel MD    D: 11/22/2021 17:57:40       T: 11/23/2021 0:31:56     /[email or web address]  Job#: [id number]     Doc#: [id number]    CC:  Da Lopez

## 2021-11-26 ENCOUNTER — TELEPHONE (OUTPATIENT)
Dept: ORTHOPEDIC SURGERY | Age: 31
End: 2021-11-26

## 2021-11-26 NOTE — TELEPHONE ENCOUNTER
Called and spoke with patients  - walked him through loosening the bandage, elevating above heart, icing and taking 1 pain pill every 4 hours till pain is controlled if needed. They will call back with any other questions or concerns.

## 2021-11-26 NOTE — TELEPHONE ENCOUNTER
General Question     Subject: PATIENT'S  IS CALLING STATING PATIENT LEFT ANKLE IS SWOLLEN AND MORE PAIN THAN EVEN BEFORE SURGERY.     Patient: Phoebe Lujan Number: [phone]

## 2021-12-07 ENCOUNTER — OFFICE VISIT (OUTPATIENT)
Dept: ORTHOPEDIC SURGERY | Age: 31
End: 2021-12-07
Payer: COMMERCIAL

## 2021-12-07 VITALS — BODY MASS INDEX: 32.07 KG/M2 | HEIGHT: 70 IN | WEIGHT: 224 LBS

## 2021-12-07 DIAGNOSIS — S93.439A SYNDESMOTIC DISRUPTION OF ANKLE, INITIAL ENCOUNTER: Primary | ICD-10-CM

## 2021-12-07 DIAGNOSIS — S82.842A BIMALLEOLAR FRACTURE, LEFT, CLOSED, INITIAL ENCOUNTER: ICD-10-CM

## 2021-12-07 DIAGNOSIS — S82.892A CLOSED FRACTURE DISLOCATION OF LEFT ANKLE, INITIAL ENCOUNTER: ICD-10-CM

## 2021-12-07 PROCEDURE — 99024 POSTOP FOLLOW-UP VISIT: CPT | Performed by: NURSE PRACTITIONER

## 2021-12-07 PROCEDURE — APPNB15 APP NON BILLABLE TIME 0-15 MINS: Performed by: NURSE PRACTITIONER

## 2021-12-07 PROCEDURE — L4361 PNEUMA/VAC WALK BOOT PRE OTS: HCPCS | Performed by: NURSE PRACTITIONER

## 2021-12-07 NOTE — PROGRESS NOTES
DIAGNOSIS:    1-Left ankle fracture dislocation, status post ORIF. 2-Left ankle distal tibiofibular syndesmosis disruption, status post ORIF syndesmosis screw     DATE OF SURGERY: 11/22/2021. HISTORY OF PRESENT ILLNESS:  Ms. Joan Hogue 35 y.o. female who came in today for 2 weeks postoperative visit. The patient denies any significant pain in the left ankle. Rates pain a 4/10 VAS moderate, aching, intermittent and are improving. Aggravating factors movement. Alleviating factors elevation and rest. She has been in a splint, and non WB. No numbness or tingling sensation. No fever or Chills. Denies smoking. PHYSICAL EXAMINATION:  The incision healing well. No signs of any erythema or drainage, moderate swelling. She has no pain with the active or passive range of motion of the left ankle, but decrease ROM. She has intact sensation distally, and she is neurovascularly intact. IMAGING:  Three views left ankle taken today in the office showed anatomic alignment of the fracture, plate and screws in good position, no loosening. Ankle mortise is well centered. Syndesmosis screw intact. IMPRESSION:  2 weeks out from  1-Left ankle fracture dislocation, status post ORIF. 2-Left ankle distal tibiofibular syndesmosis disruption, status post ORIF syndesmosis screw     PLAN: She placed in a boot, and can start TTWB using assistance. I have told the patient to work on ROM. She was instructed to rest and elevate to help with the swelling. The patient will come back for a follow up in 6 weeks. At that time, we will take 3 views of the left ankle standing. She will likely need a staged procedure for syndesmosis screw removal at 4-5 months. Procedures    Breg Tall Zoe Walking Boot     Patient was prescribed a Breg Tall Zoe Walking Boot. The left ankle will require stabilization / immobilization from this semi-rigid / rigid orthosis to improve their function.   The orthosis will assist in protecting the affected area, provide functional support and facilitate healing. Patient was instructed to progress ambulation toe touch      The patient was educated and fit by a healthcare professional with expert knowledge and specialization in brace application while under the direct supervision of the physician. Verbal and written instructions for the use of and application of this item were provided. They were instructed to contact the office immediately should the brace result in increased pain, decreased sensation, increased swelling or worsening of the condition.      Fay Becerra, APRN - CNP

## 2021-12-08 ENCOUNTER — TELEPHONE (OUTPATIENT)
Dept: ORTHOPEDIC SURGERY | Age: 31
End: 2021-12-08

## 2021-12-08 NOTE — TELEPHONE ENCOUNTER
Spoke to patient. Asked her to come to [facility] today for us to examine the boot and replace if needed.  Patient was informed to come no later than 4:30 PM.

## 2021-12-08 NOTE — TELEPHONE ENCOUNTER
Other PATIENT CALLED STATES THAT HER BOOT IS TOO BIG OR LOOSE AND HER FOOT MOVES AROUNT CAUSING PAIN. PATIENT WANTING TO KNOW IF SHE CAN COME IN TO GET RESIZED.  PLEASE CALL TO [facility]ISE [phone]

## 2021-12-14 ENCOUNTER — TELEPHONE (OUTPATIENT)
Dept: ORTHOPEDIC SURGERY | Age: 31
End: 2021-12-14

## 2021-12-14 NOTE — TELEPHONE ENCOUNTER
LVM for patient to call back. Upon return call, verify patient has finished pain medication. Provider feels if the pain medication has been stopped, she is not getting sick from the meds. Patient to be informed that she should follow up with her primary care doctor for the symptoms to be assessed further. No Rx sent.

## 2021-12-15 NOTE — TELEPHONE ENCOUNTER
Second attempt call, spoke to [place]. She has followed up with her PCP who is monitoring and advised it may be a delayed response from anesthesia.

## 2022-01-18 ENCOUNTER — OFFICE VISIT (OUTPATIENT)
Dept: ORTHOPEDIC SURGERY | Age: 32
End: 2022-01-18

## 2022-01-18 VITALS — WEIGHT: 224 LBS | HEIGHT: 70 IN | BODY MASS INDEX: 32.07 KG/M2

## 2022-01-18 DIAGNOSIS — S82.892A CLOSED FRACTURE DISLOCATION OF LEFT ANKLE, INITIAL ENCOUNTER: ICD-10-CM

## 2022-01-18 DIAGNOSIS — S93.439A SYNDESMOTIC DISRUPTION OF ANKLE, INITIAL ENCOUNTER: Primary | ICD-10-CM

## 2022-01-18 PROCEDURE — APPNB15 APP NON BILLABLE TIME 0-15 MINS: Performed by: NURSE PRACTITIONER

## 2022-01-18 PROCEDURE — 99024 POSTOP FOLLOW-UP VISIT: CPT | Performed by: NURSE PRACTITIONER

## 2022-01-18 NOTE — LETTER
[facility]  [address] [place] [address] [address]  Phone: [phone]  Fax: [phone]    Eduarda Norwood MD         January 18, 2022     Patient: Harlan Luz   YOB: 1990   Date of Visit: 1/18/2022       To Whom It May Concern: It is my medical opinion that Eric Allen requires a disability parking placard for the following reasons:  {reasons:02300}  Duration of need: {time:38880}    If you have any questions or concerns, please don't hesitate to call.     Sincerely,        Eduarda Norwood MD

## 2022-01-18 NOTE — LETTER
[facility]  [address] [place] [address] [address]  Phone: [phone]  Fax: [phone]    Lucero Breaux MD        January 18, 2022     Patient: Beatriz Drew   YOB: 1990   Date of Visit: 1/18/2022       To Whom it May Concern:    Lolly Rodgers was seen in my clinic on 1/18/2022. She may return to school on 1/19/2022. If you have any questions or concerns, please don't hesitate to call.     Sincerely,     \      Lucero Breaux MD

## 2022-01-19 NOTE — PROGRESS NOTES
DIAGNOSIS:    1-Left ankle fracture dislocation, status post ORIF. 2-Left ankle distal tibiofibular syndesmosis disruption, status post ORIF syndesmosis screw     DATE OF SURGERY: 11/22/2021. HISTORY OF PRESENT ILLNESS:  Ms. Cleveland De Leon 28 y.o. female who came in today for 8 weeks postoperative visit. The patient denies any significant pain in the left ankle. Rates pain a 0/10 VAS and is doing very well. She has been in a boot, and non WB using a knee scooter. No numbness or tingling sensation. No fever or Chills. Denies smoking. PHYSICAL EXAMINATION:  The incision healing well. No signs of any erythema or drainage, mild swelling. She has no pain with the active or passive range of motion of the left ankle, but decreased ROM. She has intact sensation distally, and she is neurovascularly intact. IMAGING:  Three views left ankle taken today in the office showed anatomic alignment of the fracture, plate and screws in good position, no loosening. Ankle mortise is well centered. Syndesmosis screw intact. IMPRESSION:  8 weeks out from  1-Left ankle fracture dislocation, status post ORIF. 2-Left ankle distal tibiofibular syndesmosis disruption, status post ORIF syndesmosis screw     PLAN:  She will be WBAT in the boot, and start aggressive ROM and peroneal strengthening exercise. Off the boot in 2 weeks. No heavy impact activities. The patient will come back for a follow up in 6 weeks. At that time, we will take 3 views of the left ankle standing. She will likely need a staged procedure for syndesmosis screw removal at 4-5 months.          Anika Giraldo, APRN - CNP

## 2022-03-01 ENCOUNTER — OFFICE VISIT (OUTPATIENT)
Dept: ORTHOPEDIC SURGERY | Age: 32
End: 2022-03-01
Payer: COMMERCIAL

## 2022-03-01 VITALS — BODY MASS INDEX: 32.07 KG/M2 | HEIGHT: 70 IN | WEIGHT: 224 LBS

## 2022-03-01 DIAGNOSIS — S93.439A SYNDESMOTIC DISRUPTION OF ANKLE, INITIAL ENCOUNTER: ICD-10-CM

## 2022-03-01 DIAGNOSIS — S82.842A BIMALLEOLAR FRACTURE, LEFT, CLOSED, INITIAL ENCOUNTER: Primary | ICD-10-CM

## 2022-03-01 PROCEDURE — G8417 CALC BMI ABV UP PARAM F/U: HCPCS | Performed by: ORTHOPAEDIC SURGERY

## 2022-03-01 PROCEDURE — G8427 DOCREV CUR MEDS BY ELIG CLIN: HCPCS | Performed by: ORTHOPAEDIC SURGERY

## 2022-03-01 PROCEDURE — 99213 OFFICE O/P EST LOW 20 MIN: CPT | Performed by: ORTHOPAEDIC SURGERY

## 2022-03-01 PROCEDURE — 1036F TOBACCO NON-USER: CPT | Performed by: ORTHOPAEDIC SURGERY

## 2022-03-01 PROCEDURE — G8484 FLU IMMUNIZE NO ADMIN: HCPCS | Performed by: ORTHOPAEDIC SURGERY

## 2022-03-01 RX ORDER — CHOLECALCIFEROL (VITAMIN D3) 125 MCG
500 CAPSULE ORAL DAILY
COMMUNITY

## 2022-03-01 NOTE — PROGRESS NOTES
DIAGNOSIS:    1-Left ankle fracture dislocation, status post ORIF. 2-Left ankle distal tibiofibular syndesmosis disruption, status post ORIF syndesmosis screw     DATE OF SURGERY: 11/22/2021. HISTORY OF PRESENT ILLNESS:  Lizzie Saldivar 28 y.o. female who came in today for 3 months postoperative visit. The patient denies any significant pain in the left ankle. Rates pain a 0/10 VAS and is doing very well. She has been WB and doing well. She does still have some stiffness. Her pain can increase to a 6/10 VAS when doing increased activities. She states she does have chronic low back pain and would like to start swimming again. No numbness or tingling sensation. No fever or Chills. Denies smoking. Past Medical History:   Diagnosis Date    Anxiety        Past Surgical History:   Procedure Laterality Date    ANKLE FRACTURE SURGERY Left 11/22/2021    OPEN REDUCTION INTERNAL FIXATION LEFT ANKLE performed by Enzo Lima MD at [address] [place] [address] History     Socioeconomic History    Marital status:      Spouse name: Not on file    Number of children: Not on file    Years of education: Not on file    Highest education level: Not on file   Occupational History    Not on file   Tobacco Use    Smoking status: Never Smoker    Smokeless tobacco: Never Used   Vaping Use    Vaping Use: Never used   Substance and Sexual Activity    Alcohol use: Not Currently    Drug use: Never    Sexual activity: Not on file   Other Topics Concern    Not on file   Social History Narrative    Not on file     Social Determinants of Health     Financial Resource Strain:     Difficulty of Paying Living Expenses: Not on file   Food Insecurity:     Worried About [address] in the Last Year: Not on file    Connie of Food in the Last Year: Not on file   Transportation Needs:     Lack of Transportation (Medical): Not on file    Lack of Transportation (Non-Medical):  Not on file   Physical Activity:     Days of Exercise per Week: Not on file    Minutes of Exercise per Session: Not on file   Stress:     Feeling of Stress : Not on file   Social Connections:     Frequency of Communication with Friends and Family: Not on file    Frequency of Social Gatherings with Friends and Family: Not on file    Attends Jehovah's witness Services: Not on file    Active Member of Clubs or Organizations: Not on file    Attends Club or Organization Meetings: Not on file    Marital Status: Not on file   Intimate Partner Violence:     Fear of Current or Ex-Partner: Not on file    Emotionally Abused: Not on file    Physically Abused: Not on file    Sexually Abused: Not on file   Housing Stability:     Unable to Pay for Housing in the Last Year: Not on file    Number of [place] in the Last Year: Not on file    Unstable Housing in the Last Year: Not on file       No family history on file. Current Outpatient Medications on File Prior to Visit   Medication Sig Dispense Refill    vitamin B-12 (CYANOCOBALAMIN) 500 MCG tablet Take 500 mcg by mouth daily      omeprazole (PRILOSEC) 40 MG delayed release capsule Take 40 mg by mouth daily      metFORMIN (GLUCOPHAGE) 500 MG tablet Take 500 mg by mouth daily (with breakfast) For PCOS (Patient not taking: Reported on 3/1/2022)      buPROPion (WELLBUTRIN SR) 150 MG extended release tablet Take 150 mg by mouth daily       No current facility-administered medications on file prior to visit. Pertinent items are noted in HPI  Review of systems reviewed from Patient History Form and available in the patient's chart under the Media tab. No change noted. PHYSICAL EXAMINATION:  Ms. Cleveland De Leon is a very pleasant 28 y.o.  female who presents today in no acute distress, awake, alert, and oriented. She is well dressed, nourished and  groomed. Patient with normal affect. Height is  5' 10\" (1.778 m), weight is 224 lb (101.6 kg), Body mass index is 32.14 kg/m².   Resting respiratory rate is 16. The patient walks with no limp. The incision healing well. No signs of any erythema or drainage, mild swelling. She has no pain with the active or passive range of motion of the left ankle, but decreased ROM. She has intact sensation distally, and she is neurovascularly intact. Ankle reflex 1+ bilaterally. Good strength, and no instability both upper and lower extremities. IMAGING:  Three views left ankle taken today in the office showed anatomic alignment of the fracture, plate and screws in good position, no loosening. Ankle mortise is well centered. Syndesmosis screw intact. IMPRESSION: 3 months out from  1-Left ankle fracture dislocation, status post ORIF. 2-Left ankle distal tibiofibular syndesmosis disruption, status post ORIF syndesmosis screw     PLAN:  She will be WBAT and continue to work on ROM and peroneal strengthening exercise. No heavy impact activities. The patient will come back for a follow up in 4 weeks. At that time, we will take 3 views of the left ankle standing. She will likely need a staged procedure for syndesmosis screw removal at 4-5 months.        Davis Stockton MD

## 2022-03-29 ENCOUNTER — OFFICE VISIT (OUTPATIENT)
Dept: ORTHOPEDIC SURGERY | Age: 32
End: 2022-03-29
Payer: COMMERCIAL

## 2022-03-29 VITALS — BODY MASS INDEX: 32.07 KG/M2 | HEIGHT: 70 IN | WEIGHT: 224 LBS

## 2022-03-29 DIAGNOSIS — S82.842A BIMALLEOLAR FRACTURE, LEFT, CLOSED, INITIAL ENCOUNTER: Primary | ICD-10-CM

## 2022-03-29 DIAGNOSIS — S93.439A SYNDESMOTIC DISRUPTION OF ANKLE, INITIAL ENCOUNTER: ICD-10-CM

## 2022-03-29 PROCEDURE — 99214 OFFICE O/P EST MOD 30 MIN: CPT | Performed by: ORTHOPAEDIC SURGERY

## 2022-03-29 PROCEDURE — G8484 FLU IMMUNIZE NO ADMIN: HCPCS | Performed by: ORTHOPAEDIC SURGERY

## 2022-03-29 PROCEDURE — G8417 CALC BMI ABV UP PARAM F/U: HCPCS | Performed by: ORTHOPAEDIC SURGERY

## 2022-03-29 PROCEDURE — 1036F TOBACCO NON-USER: CPT | Performed by: ORTHOPAEDIC SURGERY

## 2022-03-29 PROCEDURE — G8428 CUR MEDS NOT DOCUMENT: HCPCS | Performed by: ORTHOPAEDIC SURGERY

## 2022-03-29 NOTE — PROGRESS NOTES
DIAGNOSIS:    1-Left ankle fracture dislocation, status post ORIF. 2-Left ankle distal tibiofibular syndesmosis disruption, status post ORIF syndesmosis screw     DATE OF SURGERY: 11/22/2021. HISTORY OF PRESENT ILLNESS:  Lizzie Saldivar 28 y.o. female who came in today for 4 months postoperative visit. The patient denies any significant pain in the left ankle. Rates pain a 0/10 VAS and is doing very well. She has been WB and doing well. She does still have some stiffness. She states she does have chronic low back pain and would like to start swimming again. No numbness or tingling sensation. No fever or Chills. Denies smoking. Past Medical History:   Diagnosis Date    Anxiety        Past Surgical History:   Procedure Laterality Date    ANKLE FRACTURE SURGERY Left 11/22/2021    OPEN REDUCTION INTERNAL FIXATION LEFT ANKLE performed by Daniel Sanchez MD at [address] [place] [address] History     Socioeconomic History    Marital status:      Spouse name: Not on file    Number of children: Not on file    Years of education: Not on file    Highest education level: Not on file   Occupational History    Not on file   Tobacco Use    Smoking status: Never Smoker    Smokeless tobacco: Never Used   Vaping Use    Vaping Use: Never used   Substance and Sexual Activity    Alcohol use: Not Currently    Drug use: Never    Sexual activity: Not on file   Other Topics Concern    Not on file   Social History Narrative    Not on file     Social Determinants of Health     Financial Resource Strain:     Difficulty of Paying Living Expenses: Not on file   Food Insecurity:     Worried About [address] in the Last Year: Not on file    Connie of Food in the Last Year: Not on file   Transportation Needs:     Lack of Transportation (Medical): Not on file    Lack of Transportation (Non-Medical):  Not on file   Physical Activity:     Days of Exercise per Week: Not on file    Minutes of Exercise per Session: Not on file   Stress:     Feeling of Stress : Not on file   Social Connections:     Frequency of Communication with Friends and Family: Not on file    Frequency of Social Gatherings with Friends and Family: Not on file    Attends Christianity Services: Not on file    Active Member of Clubs or Organizations: Not on file    Attends Club or Organization Meetings: Not on file    Marital Status: Not on file   Intimate Partner Violence:     Fear of Current or Ex-Partner: Not on file    Emotionally Abused: Not on file    Physically Abused: Not on file    Sexually Abused: Not on file   Housing Stability:     Unable to Pay for Housing in the Last Year: Not on file    Number of [place] in the Last Year: Not on file    Unstable Housing in the Last Year: Not on file       History reviewed. No pertinent family history. Current Outpatient Medications on File Prior to Visit   Medication Sig Dispense Refill    vitamin B-12 (CYANOCOBALAMIN) 500 MCG tablet Take 500 mcg by mouth daily      omeprazole (PRILOSEC) 40 MG delayed release capsule Take 40 mg by mouth daily      metFORMIN (GLUCOPHAGE) 500 MG tablet Take 500 mg by mouth daily (with breakfast) For PCOS (Patient not taking: Reported on 3/1/2022)      buPROPion (WELLBUTRIN SR) 150 MG extended release tablet Take 150 mg by mouth daily       No current facility-administered medications on file prior to visit. Pertinent items are noted in HPI  Review of systems reviewed from Patient History Form and available in the patient's chart under the Media tab. No change noted. PHYSICAL EXAMINATION:  Ms. Rigoberto Bowie is a very pleasant 28 y.o.  female who presents today in no acute distress, awake, alert, and oriented. She is well dressed, nourished and  groomed. Patient with normal affect. Height is  5' 10\" (1.778 m), weight is 224 lb (101.6 kg), Body mass index is 32.14 kg/m². Resting respiratory rate is 16. The patient walks with no limp.   The incision healing well. No signs of any erythema or drainage, mild swelling. She has no pain with the active or passive range of motion of the left ankle, but decreased ROM. She has intact sensation distally, and she is neurovascularly intact. Ankle reflex 1+ bilaterally. Good strength, and no instability both upper and lower extremities. IMAGING:  Three views left ankle taken today in the office showed anatomic alignment of the fracture, plate and screws in good position, no loosening. Ankle mortise is well centered. Syndesmosis screw intact. IMPRESSION: 4 months out from  1-Left ankle fracture dislocation, status post ORIF. 2-Left ankle distal tibiofibular syndesmosis disruption, status post ORIF syndesmosis screw     PLAN:  She will be WBAT and continue to work on ROM and peroneal strengthening exercise. I discussed with the patient the risks and benefits of screw removal.     I discussed the risks and benefits of surgery with the patient, including but not limited to infection, bleeding, pain, injury to nerves or blood vessels failure of the surgery and need for additional surgery. All the patient's questions were answered. We discussed an expected post-operative course. she  is understanding of this and wishes to proceed. Plan on surgery Thursday at [facility], 4/7/2022.        Samantha Scott MD

## 2022-03-29 NOTE — LETTER
[facility]  Surgery Scheduling Form:      DEMOGRAPHICS:                                                                                                              .    Patient Name:  Maribel Pardo  Patient :  1990   Patient SS#:      Patient Phone:  [phone] (home)  Alt. Patient Phone:    Patient Address:  [address] [address]    PCP:  Hema Oneill MD    Insurance ID Number:  Payor/Plan Subscr  Sex Relation Sub. Ins. ID Effective Group Num   1. [facility] 1988 Male Spouse [id number]                                   [address]        DIAGNOSIS & PROCEDURE:                                                                                                Diagnosis:   Left ankle painful hardware T84.84XA  Operation:  Left ankle syndesmosis screw removal   Location: [facility]  Provider:  Carlene Wallace MD      [facility] INFORMATION:                                                                                         .    Requested Date:  22    OR Time:  7:00                      Patient Arrival Time:  6:00  OR Time Required:  15  Minutes  Anesthesia:  General  Equipment:  none  Mini C-Arm:  No   Standard C-Arm:  Yes  Status:  Outpatient  PAT Required:  Yes    Comments:  COVID:           Mel Wallace MD     22       Pre Operative Physician Prophylaxis Orders - SCIP Protocols      Patient: Maribel Pardo  :    1990    Procedure: 22 Left ankle syndesmosis screw removal     COVID: =  HEIGHT:  Ht Readings from Last 1 Encounters:   22 5' 10\" (1.778 m)                      WEIGHT:  Wt Readings from Last 1 Encounters:   22 224 lb (101.6 kg)         History & Physical:  [ Edmonia Plush By Surgeon  [ ]By PCP  [ Santy Kenneyroom Report     Pre-Operative Antibiotic Order:     []  ----  No Antibiotic Ordered       [x]  ----  Give the following Antibiotic within 1 hour prior to start time: Cefazolin 2g IV <119.9kg (264lbs) OR 3g if >120kg (081EUE)       or      Clindamycin 900 mg IV (over 1 hour) if patient is allergic to           PENICILLINS or CAPHALOSPORIN       VTE prophylaxis [ ] Thigh hi  TEDS  [ ]  Knee Hi TEDS [ ] Foot Pumps [ ] Compression Devices      Physician Signature:     Date: 3/29/22  Time: 3:50 PM

## 2022-03-30 ENCOUNTER — TELEPHONE (OUTPATIENT)
Dept: ORTHOPEDIC SURGERY | Age: 32
End: 2022-03-30

## 2022-03-30 NOTE — TELEPHONE ENCOUNTER
CPT: 25570  AUTH: [id number]  DATE RANGE: 4/7/22 TO 7/6/22  INSURANCE: [facility]  LOCATION [facility]  NOTE: DOC SCANNED

## 2022-04-04 RX ORDER — M-VIT,TX,IRON,MINS/CALC/FOLIC 27MG-0.4MG
1 TABLET ORAL DAILY
COMMUNITY

## 2022-04-04 NOTE — PROGRESS NOTES
Name_______________________________________Printed:____________________  Date and time of surgery__4/7/2022___0700___________________Arrival Time:__0600  masc______________   1. The instructions given regarding when and if a patient needs to stop oral intake prior to surgery varies. Follow the specific instructions you were given                  _x__Nothing to eat or to drink after Midnight the night before.                   ____Carbo loading or ERAS instructions will be given to select patients-if you have been given those instructions -please do the following                           The evening before your surgery after dinner before midnight drink 40 ounces of gatorade. If you are diabetic use sugar free. The morning of surgery drink 40 ounces of water. This needs to be finished 3 hours prior to your surgery start time. 2. Take the following pills with a small sip of water on the morning of surgery_omeprazole__________________________________________________                  Do not take blood pressure medications ending in pril or sartan the kya prior to surgery or the morning of surgery_   3. Aspirin, Ibuprofen, Advil, Naproxen, Vitamin E and other Anti-inflammatory products and supplements should be stopped for 5 -7days before surgery or as directed by your physician. 4. Check with your Doctor regarding stopping Plavix, Coumadin,Eliquis, Lovenox,Effient,Pradaxa,Xarelto, Fragmin or other blood thinners and follow their instructions. 5. Do not smoke, and do not drink any alcoholic beverages 24 hours prior to surgery. This includes NA Beer. Refrain from the usage of any recreational drugs. 6. You may brush your teeth and gargle the morning of surgery. DO NOT SWALLOW WATER   7. You MUST make arrangements for a responsible adult to stay on site while you are here and take you home after your surgery. You will not be allowed to leave alone or drive yourself home.   It is strongly suggested someone stay with you the first 24 hrs. Your surgery will be cancelled if you do not have a ride home. 8. A parent/legal guardian must accompany a child scheduled for surgery and plan to stay at the hospital until the child is discharged. Please do not bring other children with you. 9. Please wear simple, loose fitting clothing to the hospital.  Devota Plants not bring valuables (money, credit cards, checkbooks, etc.) Do not wear any makeup (including no eye makeup) or nail polish on your fingers or toes. 10. DO NOT wear any jewelry or piercings on day of surgery. All body piercing jewelry must be removed. 11. If you have ___dentures, they will be removed before going to the OR; we will provide you a container. If you wear ___contact lenses or _x__glasses, they will be removed; please bring a case for them. 12. Please see your family doctor/pediatrician for a history & physical and/or concerning medications. Bring any test results/reports from your physician's office. PCP__________________Phone___________H&P Appt. Date________             13 If you  have a Living Will and Durable Power of  for Healthcare, please bring in a copy. 15. Notify your Surgeon if you develop any illness between now and surgery  time, cough, cold, fever, sore throat, nausea, vomiting, etc.  Please notify your surgeon if you experience dizziness, shortness of breath or blurred vision between now & the time of your surgery             15. DO NOT shave your operative site 96 hours prior to surgery. For face & neck surgery, men may use an electric razor 48 hours prior to surgery. 16. Shower the night before or morning of surgery using an antibacterial soap or as you have been instructed. 17. To provide excellent care visitors will be limited to one in the room at any given time. 18.  Please bring picture ID and insurance card.              19.  Visit our web site for additional information:  [email or web address]              20.During flu season no children under the age of 15 are permitted in the hospital for the safety of all patients. 21. If you take a long acting insulin in the evening only  take half of your usual  dose the night  before your procedure              22. If you use a c-pap please bring DOS if staying overnight,             23.For your convenience [facility] has a pharmacy on site to fill your prescriptions. 24. If you use oxygen and have a portable tank please bring it  with you the DOS             25. Bring a complete list of all your medications with name and dose include any supplements. 26. Other__________________________________________   *Please call pre admission testing if you any further questions   Nabil Stein   [address] [place] [address]. [place]  [phone]   [address] [place] [address]       VISITOR POLICY(subject to change)    Current policy is 2 visitors per patient. No children. A mask is required. Visiting hours are 8a-8p. Overnight visitors will be at the discretion of the nurse. All above information reviewed with patient in person or by phone. Patient verbalizes understanding. All questions and concerns addressed.                                                                                                  Patient/Rep_patient___________________                                                                                                                                    PRE OP INSTRUCTIONS

## 2022-04-07 ENCOUNTER — HOSPITAL ENCOUNTER (OUTPATIENT)
Age: 32
Setting detail: OUTPATIENT SURGERY
Discharge: HOME | End: 2022-04-07
Attending: ORTHOPAEDIC SURGERY | Admitting: ORTHOPAEDIC SURGERY
Payer: COMMERCIAL

## 2022-04-07 ENCOUNTER — ANESTHESIA EVENT (OUTPATIENT)
Dept: OPERATING ROOM | Age: 32
End: 2022-04-07
Payer: COMMERCIAL

## 2022-04-07 ENCOUNTER — ANESTHESIA (OUTPATIENT)
Dept: OPERATING ROOM | Age: 32
End: 2022-04-07
Payer: COMMERCIAL

## 2022-04-07 VITALS
RESPIRATION RATE: 8 BRPM | SYSTOLIC BLOOD PRESSURE: 103 MMHG | DIASTOLIC BLOOD PRESSURE: 65 MMHG | TEMPERATURE: 97 F | OXYGEN SATURATION: 93 %

## 2022-04-07 VITALS
HEART RATE: 75 BPM | BODY MASS INDEX: 32.78 KG/M2 | RESPIRATION RATE: 17 BRPM | WEIGHT: 229 LBS | OXYGEN SATURATION: 99 % | HEIGHT: 70 IN | DIASTOLIC BLOOD PRESSURE: 87 MMHG | TEMPERATURE: 97.5 F | SYSTOLIC BLOOD PRESSURE: 122 MMHG

## 2022-04-07 LAB — HCG(URINE) PREGNANCY TEST: NEGATIVE

## 2022-04-07 PROCEDURE — 6360000002 HC RX W HCPCS: Performed by: REGISTERED NURSE

## 2022-04-07 PROCEDURE — 6370000000 HC RX 637 (ALT 250 FOR IP): Performed by: ANESTHESIOLOGY

## 2022-04-07 PROCEDURE — 3600000014 HC SURGERY LEVEL 4 ADDTL 15MIN: Performed by: ORTHOPAEDIC SURGERY

## 2022-04-07 PROCEDURE — 2580000003 HC RX 258: Performed by: ANESTHESIOLOGY

## 2022-04-07 PROCEDURE — 2500000003 HC RX 250 WO HCPCS: Performed by: REGISTERED NURSE

## 2022-04-07 PROCEDURE — 2500000003 HC RX 250 WO HCPCS: Performed by: ORTHOPAEDIC SURGERY

## 2022-04-07 PROCEDURE — 3700000001 HC ADD 15 MINUTES (ANESTHESIA): Performed by: ORTHOPAEDIC SURGERY

## 2022-04-07 PROCEDURE — 20680 REMOVAL OF IMPLANT DEEP: CPT | Performed by: ORTHOPAEDIC SURGERY

## 2022-04-07 PROCEDURE — 84703 CHORIONIC GONADOTROPIN ASSAY: CPT

## 2022-04-07 PROCEDURE — 6360000002 HC RX W HCPCS: Performed by: ANESTHESIOLOGY

## 2022-04-07 PROCEDURE — 3700000000 HC ANESTHESIA ATTENDED CARE: Performed by: ORTHOPAEDIC SURGERY

## 2022-04-07 PROCEDURE — 7100000001 HC PACU RECOVERY - ADDTL 15 MIN: Performed by: ORTHOPAEDIC SURGERY

## 2022-04-07 PROCEDURE — 2709999900 HC NON-CHARGEABLE SUPPLY: Performed by: ORTHOPAEDIC SURGERY

## 2022-04-07 PROCEDURE — 2580000003 HC RX 258: Performed by: REGISTERED NURSE

## 2022-04-07 PROCEDURE — 7100000011 HC PHASE II RECOVERY - ADDTL 15 MIN: Performed by: ORTHOPAEDIC SURGERY

## 2022-04-07 PROCEDURE — 7100000000 HC PACU RECOVERY - FIRST 15 MIN: Performed by: ORTHOPAEDIC SURGERY

## 2022-04-07 PROCEDURE — 7100000010 HC PHASE II RECOVERY - FIRST 15 MIN: Performed by: ORTHOPAEDIC SURGERY

## 2022-04-07 PROCEDURE — 6360000002 HC RX W HCPCS: Performed by: ORTHOPAEDIC SURGERY

## 2022-04-07 PROCEDURE — 3600000004 HC SURGERY LEVEL 4 BASE: Performed by: ORTHOPAEDIC SURGERY

## 2022-04-07 RX ORDER — FENTANYL CITRATE 50 UG/ML
50 INJECTION, SOLUTION INTRAMUSCULAR; INTRAVENOUS EVERY 5 MIN PRN
Status: DISCONTINUED | OUTPATIENT
Start: 2022-04-07 | End: 2022-04-07 | Stop reason: HOSPADM

## 2022-04-07 RX ORDER — CEPHALEXIN 500 MG/1
500 CAPSULE ORAL 4 TIMES DAILY
Qty: 12 CAPSULE | Refills: 0 | Status: SHIPPED | OUTPATIENT
Start: 2022-04-07 | End: 2022-04-10

## 2022-04-07 RX ORDER — SODIUM CHLORIDE 9 MG/ML
INJECTION, SOLUTION INTRAVENOUS CONTINUOUS
Status: DISCONTINUED | OUTPATIENT
Start: 2022-04-07 | End: 2022-04-07 | Stop reason: HOSPADM

## 2022-04-07 RX ORDER — LABETALOL HYDROCHLORIDE 5 MG/ML
10 INJECTION, SOLUTION INTRAVENOUS
Status: DISCONTINUED | OUTPATIENT
Start: 2022-04-07 | End: 2022-04-07 | Stop reason: HOSPADM

## 2022-04-07 RX ORDER — DEXAMETHASONE SODIUM PHOSPHATE 4 MG/ML
INJECTION, SOLUTION INTRA-ARTICULAR; INTRALESIONAL; INTRAMUSCULAR; INTRAVENOUS; SOFT TISSUE PRN
Status: DISCONTINUED | OUTPATIENT
Start: 2022-04-07 | End: 2022-04-07 | Stop reason: SDUPTHER

## 2022-04-07 RX ORDER — HALOPERIDOL 5 MG/ML
1 INJECTION INTRAMUSCULAR
Status: DISCONTINUED | OUTPATIENT
Start: 2022-04-07 | End: 2022-04-07 | Stop reason: HOSPADM

## 2022-04-07 RX ORDER — FENTANYL CITRATE 50 UG/ML
INJECTION, SOLUTION INTRAMUSCULAR; INTRAVENOUS PRN
Status: DISCONTINUED | OUTPATIENT
Start: 2022-04-07 | End: 2022-04-07 | Stop reason: SDUPTHER

## 2022-04-07 RX ORDER — PROPOFOL 10 MG/ML
INJECTION, EMULSION INTRAVENOUS PRN
Status: DISCONTINUED | OUTPATIENT
Start: 2022-04-07 | End: 2022-04-07 | Stop reason: SDUPTHER

## 2022-04-07 RX ORDER — LIDOCAINE HYDROCHLORIDE 20 MG/ML
INJECTION, SOLUTION EPIDURAL; INFILTRATION; INTRACAUDAL; PERINEURAL PRN
Status: DISCONTINUED | OUTPATIENT
Start: 2022-04-07 | End: 2022-04-07 | Stop reason: SDUPTHER

## 2022-04-07 RX ORDER — SODIUM CHLORIDE 9 MG/ML
INJECTION, SOLUTION INTRAVENOUS CONTINUOUS PRN
Status: DISCONTINUED | OUTPATIENT
Start: 2022-04-07 | End: 2022-04-07 | Stop reason: SDUPTHER

## 2022-04-07 RX ORDER — BUPIVACAINE HYDROCHLORIDE 5 MG/ML
INJECTION, SOLUTION EPIDURAL; INTRACAUDAL
Status: COMPLETED | OUTPATIENT
Start: 2022-04-07 | End: 2022-04-07

## 2022-04-07 RX ORDER — SCOLOPAMINE TRANSDERMAL SYSTEM 1 MG/1
1 PATCH, EXTENDED RELEASE TRANSDERMAL
Status: DISCONTINUED | OUTPATIENT
Start: 2022-04-07 | End: 2022-04-07 | Stop reason: HOSPADM

## 2022-04-07 RX ORDER — APREPITANT 40 MG/1
40 CAPSULE ORAL ONCE
Status: COMPLETED | OUTPATIENT
Start: 2022-04-07 | End: 2022-04-07

## 2022-04-07 RX ORDER — ACETAMINOPHEN 325 MG/1
650 TABLET ORAL ONCE
Status: COMPLETED | OUTPATIENT
Start: 2022-04-07 | End: 2022-04-07

## 2022-04-07 RX ORDER — OXYCODONE HYDROCHLORIDE 5 MG/1
5 TABLET ORAL PRN
Status: DISCONTINUED | OUTPATIENT
Start: 2022-04-07 | End: 2022-04-07 | Stop reason: HOSPADM

## 2022-04-07 RX ORDER — MIDAZOLAM HYDROCHLORIDE 2 MG/2ML
2 INJECTION, SOLUTION INTRAMUSCULAR; INTRAVENOUS
Status: DISCONTINUED | OUTPATIENT
Start: 2022-04-07 | End: 2022-04-07 | Stop reason: HOSPADM

## 2022-04-07 RX ORDER — MEPERIDINE HYDROCHLORIDE 25 MG/ML
12.5 INJECTION INTRAMUSCULAR; INTRAVENOUS; SUBCUTANEOUS EVERY 5 MIN PRN
Status: DISCONTINUED | OUTPATIENT
Start: 2022-04-07 | End: 2022-04-07 | Stop reason: HOSPADM

## 2022-04-07 RX ORDER — DIPHENHYDRAMINE HYDROCHLORIDE 50 MG/ML
12.5 INJECTION INTRAMUSCULAR; INTRAVENOUS
Status: DISCONTINUED | OUTPATIENT
Start: 2022-04-07 | End: 2022-04-07 | Stop reason: HOSPADM

## 2022-04-07 RX ORDER — ONDANSETRON 2 MG/ML
4 INJECTION INTRAMUSCULAR; INTRAVENOUS
Status: DISCONTINUED | OUTPATIENT
Start: 2022-04-07 | End: 2022-04-07 | Stop reason: HOSPADM

## 2022-04-07 RX ORDER — MIDAZOLAM HYDROCHLORIDE 1 MG/ML
INJECTION INTRAMUSCULAR; INTRAVENOUS PRN
Status: DISCONTINUED | OUTPATIENT
Start: 2022-04-07 | End: 2022-04-07 | Stop reason: SDUPTHER

## 2022-04-07 RX ORDER — ONDANSETRON 2 MG/ML
INJECTION INTRAMUSCULAR; INTRAVENOUS PRN
Status: DISCONTINUED | OUTPATIENT
Start: 2022-04-07 | End: 2022-04-07 | Stop reason: SDUPTHER

## 2022-04-07 RX ORDER — HYDRALAZINE HYDROCHLORIDE 20 MG/ML
10 INJECTION INTRAMUSCULAR; INTRAVENOUS
Status: DISCONTINUED | OUTPATIENT
Start: 2022-04-07 | End: 2022-04-07 | Stop reason: HOSPADM

## 2022-04-07 RX ORDER — FENTANYL CITRATE 50 UG/ML
25 INJECTION, SOLUTION INTRAMUSCULAR; INTRAVENOUS EVERY 5 MIN PRN
Status: DISCONTINUED | OUTPATIENT
Start: 2022-04-07 | End: 2022-04-07 | Stop reason: HOSPADM

## 2022-04-07 RX ORDER — OXYCODONE HYDROCHLORIDE 5 MG/1
10 TABLET ORAL PRN
Status: DISCONTINUED | OUTPATIENT
Start: 2022-04-07 | End: 2022-04-07 | Stop reason: HOSPADM

## 2022-04-07 RX ADMIN — MIDAZOLAM 2 MG: 1 INJECTION INTRAMUSCULAR; INTRAVENOUS at 07:05

## 2022-04-07 RX ADMIN — FENTANYL CITRATE 50 MCG: 50 INJECTION, SOLUTION INTRAMUSCULAR; INTRAVENOUS at 07:11

## 2022-04-07 RX ADMIN — DEXAMETHASONE SODIUM PHOSPHATE 10 MG: 4 INJECTION, SOLUTION INTRAMUSCULAR; INTRAVENOUS at 07:12

## 2022-04-07 RX ADMIN — ACETAMINOPHEN 325MG 650 MG: 325 TABLET ORAL at 07:03

## 2022-04-07 RX ADMIN — FENTANYL CITRATE 50 MCG: 50 INJECTION, SOLUTION INTRAMUSCULAR; INTRAVENOUS at 07:08

## 2022-04-07 RX ADMIN — CEFAZOLIN SODIUM 2000 MG: 10 INJECTION, POWDER, FOR SOLUTION INTRAVENOUS at 07:04

## 2022-04-07 RX ADMIN — LIDOCAINE HYDROCHLORIDE 100 MG: 20 INJECTION, SOLUTION EPIDURAL; INFILTRATION; INTRACAUDAL; PERINEURAL at 07:08

## 2022-04-07 RX ADMIN — PROPOFOL 200 MG: 10 INJECTION, EMULSION INTRAVENOUS at 07:08

## 2022-04-07 RX ADMIN — SODIUM CHLORIDE: 9 INJECTION, SOLUTION INTRAVENOUS at 07:06

## 2022-04-07 RX ADMIN — APREPITANT 40 MG: 40 CAPSULE ORAL at 07:01

## 2022-04-07 RX ADMIN — SODIUM CHLORIDE: 9 INJECTION, SOLUTION INTRAVENOUS at 06:38

## 2022-04-07 RX ADMIN — ONDANSETRON 4 MG: 2 INJECTION INTRAMUSCULAR; INTRAVENOUS at 07:12

## 2022-04-07 ASSESSMENT — PULMONARY FUNCTION TESTS
PIF_VALUE: 9
PIF_VALUE: 8
PIF_VALUE: 5
PIF_VALUE: 4
PIF_VALUE: 11
PIF_VALUE: 1
PIF_VALUE: 1
PIF_VALUE: 2
PIF_VALUE: 18
PIF_VALUE: 2
PIF_VALUE: 4
PIF_VALUE: 9
PIF_VALUE: 0
PIF_VALUE: 4
PIF_VALUE: 4
PIF_VALUE: 3
PIF_VALUE: 28
PIF_VALUE: 9
PIF_VALUE: 0
PIF_VALUE: 4
PIF_VALUE: 7

## 2022-04-07 ASSESSMENT — PAIN SCALES - GENERAL: PAINLEVEL_OUTOF10: 0

## 2022-04-07 ASSESSMENT — LIFESTYLE VARIABLES: SMOKING_STATUS: 0

## 2022-04-07 ASSESSMENT — PAIN - FUNCTIONAL ASSESSMENT: PAIN_FUNCTIONAL_ASSESSMENT: 0-10

## 2022-04-07 NOTE — PROGRESS NOTES
Pt awake and alert. Pt on RA, VSS.  in the waiting room. Pt denies pain and nausea, tolerating PO. Skin warm LLE, palpable pulses and able to wiggle toes. Pt meets criteria to be discharged from phase 1.

## 2022-04-07 NOTE — ANESTHESIA PRE PROCEDURE
Department of Anesthesiology  Preprocedure Note       Name:  Theron Toribio   Age:  28 y.o.  :  1990                                          MRN:  [id number]         Date:  2022      Surgeon: Tony Coates):  Bertha Perdomo MD    Procedure: Procedure(s):  LEFT ANKLE SYNDESMOSIS SCREW REMOVAL    Medications prior to admission:   Prior to Admission medications    Medication Sig Start Date End Date Taking? Authorizing Provider   Multiple Vitamins-Minerals (THERAPEUTIC MULTIVITAMIN-MINERALS) tablet Take 1 tablet by mouth daily    Historical Provider, MD   vitamin B-12 (CYANOCOBALAMIN) 500 MCG tablet Take 500 mcg by mouth daily    Historical Provider, MD   omeprazole (PRILOSEC) 40 MG delayed release capsule Take 40 mg by mouth daily    Historical Provider, MD   buPROPion (WELLBUTRIN SR) 150 MG extended release tablet Take 150 mg by mouth daily    Historical Provider, MD       Current medications:    No current facility-administered medications for this visit. No current outpatient medications on file.      Facility-Administered Medications Ordered in Other Visits   Medication Dose Route Frequency Provider Last Rate Last Admin    ceFAZolin (ANCEF) 2000 mg in dextrose 5 % 50 mL IVPB  2,000 mg IntraVENous On Call to 6135 Rick Guido MD        0.9 % sodium chloride infusion   IntraVENous Continuous Yaquelin MD Pelon 100 mL/hr at 22 0638 New Bag at 22 1365       Allergies:  No Known Allergies    Problem List:    Patient Active Problem List   Diagnosis Code    Bimalleolar fracture, left, closed, initial encounter S82.842A    Syndesmotic disruption of ankle, initial encounter S93.439A    Closed fracture dislocation of left ankle joint S82.892A       Past Medical History:        Diagnosis Date    Anxiety     PONV (postoperative nausea and vomiting)     x 2 weeks post op       Past Surgical History:        Procedure Laterality Date    ANKLE FRACTURE SURGERY Left 2021    OPEN REDUCTION Pulmonary:Negative Pulmonary ROS breath sounds clear to auscultation      (-) COPD, asthma, sleep apnea, rhonchi, wheezes, rales and not a current smoker  Recent URI: denies. Cardiovascular:Negative CV ROS  Exercise tolerance: good (>4 METS),       (-) hypertension, dysrhythmias, orthopnea,  PIERRE, weak pulses and peripheral edema      Rhythm: regular  Rate: normal                    Neuro/Psych:   Negative Neuro/Psych ROS  (+) depression/anxiety    (-) seizures, TIA and CVA            ROS comment: Chronic back pain: gabapentin + robaxin GI/Hepatic/Renal: Neg GI/Hepatic/Renal ROS       (-) liver disease, no renal disease and no morbid obesity (BMI: 32.14)       Endo/Other: Negative Endo/Other ROS   (+) Diabetes (HgbA1c: 5.4%)Type II DM, well controlled, , .    (-) hypothyroidism, hyperthyroidism, blood dyscrasia                ROS comment: XR Left Ankle:  Distal fibular fracture with minimal displacement. Widening of the medial aspect of the ankle mortise, possible ligamentous injury. Abdominal:         (-) obese Abdomen: soft. Vascular: negative vascular ROS. Other Findings: Patient is a native of [place]. EMR incorrectly indicates that she speaks [place] Anesthesia Plan      general     ASA 2     (Native [place] speaker. Verbalizes understanding in [place] well. NPO appropriate, reports nausea with opiates. Aggressive PONV prophylaxis, scopolamine & emend ordered in preop.)  Induction: intravenous. MIPS: Postoperative opioids intended and Prophylactic antiemetics administered. Anesthetic plan and risks discussed with patient and spouse. Plan discussed with CRNA. This pre-anesthesia assessment may be used as a history and physical.    DOS STAFF ADDENDUM:    Pt seen and examined, chart reviewed (including anesthesia, drug and allergy history). No interval changes to history and physical examination.   Anesthetic plan, risks, benefits, alternatives, and personnel involved discussed with patient. Patient verbalized an understanding and agrees to proceed.       Ho Kingsley MD  April 7, 2022  6:48 AM

## 2022-04-07 NOTE — PROGRESS NOTES
Pt arrived from OR to PACU bay 4. Report received from OR staff. Surgical dressing in place to left ankle. Pt on 2 L simple mask, oral airway in place, ST, VSS. Will continue to monitor.

## 2022-04-07 NOTE — ANESTHESIA POSTPROCEDURE EVALUATION
Department of Anesthesiology  Postprocedure Note    Patient: Juan Daniel Marinelli  MRN: [id number]  YOB: 1990  Date of evaluation: 4/7/2022  Time:  8:29 AM     Procedure Summary     Date: 04/07/22 Room / Location: [address]    Anesthesia Start: 0350 Anesthesia Stop: 0812    Procedure: LEFT ANKLE SYNDESMOSIS SCREW REMOVAL (Left Ankle) Diagnosis: (T84.84XA LEFT ANKLE PAINFUL HARDWARE)    Surgeons: Marisol South MD Responsible Provider: Hawk Quinn MD    Anesthesia Type: general ASA Status: 2          Anesthesia Type: general    Abdelrahman Phase I: Abdelrahman Score: 7    Abdelrahman Phase II: Abdelrahman Score: 10    Last vitals: Reviewed and per EMR flowsheets.        Anesthesia Post Evaluation    Patient location during evaluation: PACU  Patient participation: complete - patient participated  Level of consciousness: awake and alert  Airway patency: patent  Nausea & Vomiting: no nausea and no vomiting  Complications: no  Cardiovascular status: blood pressure returned to baseline  Respiratory status: acceptable  Hydration status: euvolemic  Multimodal analgesia pain management approach

## 2022-04-07 NOTE — BRIEF OP NOTE
Brief Postoperative Note      Patient: Danni Mendez  YOB: 1990  MRN: [id number]    Date of Procedure: 4/7/2022    Pre-Op Diagnosis: T84.84XA LEFT ANKLE PAINFUL HARDWARE    Post-Op Diagnosis: Same       Procedure(s):  LEFT ANKLE SYNDESMOSIS SCREW REMOVAL    Surgeon(s):  Gisselle Moon MD    Assistant: Shelley Blake CNP    Anesthesia: General    Estimated Blood Loss (mL): Minimal    Complications: None    Specimens:   * No specimens in log *    Implants:  Implant Name Type Inv. Item Serial No.  Lot No. LRB No. Used Action   SCREW BNE L45MM DIA3. 5MM SM HEX HD DIA2.5MM ZOILA S STL ST - [id number]  SCREW BNE L45MM DIA3. 5MM SM HEX HD DIA2.5MM ZOILA S STL ST [id number] SASHA BIOMET TRAUMA-WD  Left 1 Explanted         Drains: * No LDAs found *    Findings: Same    Electronically signed by Gisselle Moon MD on 4/7/2022 at 7:29 AM

## 2022-04-07 NOTE — OP NOTE
[address]                     [address] [facility], [address]                                OPERATIVE REPORT    PATIENT NAME: Kenton Cabot                      :        1990  MED REC NO:   [id number]                          ROOM:  ACCOUNT NO:   [de-identified]                           ADMIT DATE: 2022  PROVIDER:     Del Mi MD    DATE OF PROCEDURE:  2022    PRIMARY CARE PROVIDER:  La Girard MD    PREOPERATIVE DIAGNOSIS:  Left ankle retained deep implant/syndesmosis  screw. POSTOPERATIVE DIAGNOSIS:  Left ankle retained deep implant/syndesmosis  screw. OPERATION PERFORMED:  Removal of deep implant left ankle syndesmosis  screw. SURGEON:  Del Mi MD    ASSISTANT:  Lorena Arriaga CNP    ANESTHESIA:  General anesthesia. ESTIMATED BLOOD LOSS:  Minimal.    COMPLICATIONS:  None. TOURNIQUET:  Left upper calf 250 mmHg. INDICATION:  This is a 81-year-old white female, fairly healthy and  active, sustained a left ankle fracture dislocation with syndesmosis  disruption and had surgery for open reduction and internal fixation on  2021. She is scheduled for a staged procedure for the left ankle  syndesmosis screw removal.  All risks, benefits, and alternatives were  discussed with the patient. She agreed to proceed with surgical  removal.    Given the patient's Body mass index is 32.86 kg/m². added significant challenge to the procedure. It required significant physical and mental effort. It required 60% more time for such procedure. DESCRIPTION OF PROCEDURE:  The patient's left ankle was marked. She  received 2 gm Ancef IV preoperatively. The patient was then brought to  the operating room, underwent general anesthesia. A well-padded  tourniquet was placed in the left upper calf. The site of the screw was  confirmed with a mini C-arm.   The left lower extremity was then prepped  and draped in a regular sterile routine fashion. A time-out was called  confirming the patient name, site, and procedure. Esmarch was used for exsanguination and tourniquet was inflated to 250  mmHg. An incision was made over the screw area. Careful dissection was  performed. It was challenging to find the screw given the patient's  size. We were able to carefully use the same incision in finding the  screw head and once we found the screw head, using the appropriate  screwdriver we removed the syndesmosis screw. At this point, we let the  tourniquet down and hemostasis was secured. We irrigated the incision  copiously with normal saline. We then closed the incision with a 4-0  Monocryl. Steri-Strips was then applied. Dressing was then applied in  the form of Xeroform, 4 x 4, sterile Webril, and an Ace wrap. The patient tolerated the procedure well and was taken to the recovery  in stable condition. Xiomy Jasmine CNP was 1st Assist given the nature of the procedure that needed advanced assistance. POSTOPERATIVE PLAN:  The patient will be discharged home. She can be  weightbearing as tolerated, no heavy impact activities for four to six  weeks.         Isidra Arellano MD    D: 04/07/2022 7:34:12       T: 04/07/2022 14:03:01     [email or web address]  Job#: [id number]     Doc#: [id number]    CC:  Marquis Negron

## 2022-04-07 NOTE — PROGRESS NOTES
Discharge instructions reviewed with patient/. All home medications have been reviewed, questions answered and patient verbalized understanding. Discharge instructions signed. Pt dc'd per wheelchair. Patient discharged home with one prescription, surgical shoe and other belongings.  taking stable pt home.

## 2022-04-07 NOTE — H&P
Preoperative H&P Update    The patient's History and Physical in the medical record from 3/29/2022 was reviewed by me today. Past Medical History:   Diagnosis Date    Anxiety     PONV (postoperative nausea and vomiting)     x 2 weeks post op     Past Surgical History:   Procedure Laterality Date    ANKLE FRACTURE SURGERY Left 11/22/2021    OPEN REDUCTION INTERNAL FIXATION LEFT ANKLE performed by Reyna Glover MD at [place] [address]     No current facility-administered medications on file prior to encounter. Current Outpatient Medications on File Prior to Encounter   Medication Sig Dispense Refill    Multiple Vitamins-Minerals (THERAPEUTIC MULTIVITAMIN-MINERALS) tablet Take 1 tablet by mouth daily      vitamin B-12 (CYANOCOBALAMIN) 500 MCG tablet Take 500 mcg by mouth daily      omeprazole (PRILOSEC) 40 MG delayed release capsule Take 40 mg by mouth daily      buPROPion (WELLBUTRIN SR) 150 MG extended release tablet Take 150 mg by mouth daily         No Known Allergies   I reviewed the HPI, medications, allergies, reason for surgery, diagnosis and treatment plan and there has been no change. The patient was evaluated by me today. Physical exam findings for this update include:    Vitals:    04/07/22 0622   BP: (!) 134/95   Pulse: 91   Resp: 20   Temp: 97.6 °F (36.4 °C)   SpO2: 98%     Airway is intact  Chest: chest clear, no wheezing, rales, normal symmetric air entry, no tachypnea, retractions or cyanosis  Heart: regular rate and rhythm ; heart sounds normal  Findings on exam of the body region where surgery is to be performed include:  Left ankle retained syndesmosis screw.     Electronically signed by Reyna Glover MD on 4/7/2022 at 6:50 AM

## 2022-04-13 ENCOUNTER — TELEPHONE (OUTPATIENT)
Dept: ORTHOPEDIC SURGERY | Age: 32
End: 2022-04-13

## 2022-04-13 NOTE — TELEPHONE ENCOUNTER
**Received images via [employer].  Will show and discuss with SMA**    Lt ankle screw removal DOS 4/7/2022

## 2022-04-13 NOTE — TELEPHONE ENCOUNTER
Please call patient. She took bandage off of sx site and looks like a burn bubble/rash. Want to know if its infected. Please call patient to advise. States she sent pictures on my chart.

## 2022-04-21 ENCOUNTER — OFFICE VISIT (OUTPATIENT)
Dept: ORTHOPEDIC SURGERY | Age: 32
End: 2022-04-21
Payer: COMMERCIAL

## 2022-04-21 VITALS — HEIGHT: 70 IN | BODY MASS INDEX: 32.78 KG/M2 | WEIGHT: 229 LBS

## 2022-04-21 DIAGNOSIS — S82.842A BIMALLEOLAR FRACTURE, LEFT, CLOSED, INITIAL ENCOUNTER: Primary | ICD-10-CM

## 2022-04-21 DIAGNOSIS — S93.439A SYNDESMOTIC DISRUPTION OF ANKLE, INITIAL ENCOUNTER: ICD-10-CM

## 2022-04-21 PROCEDURE — 99024 POSTOP FOLLOW-UP VISIT: CPT | Performed by: NURSE PRACTITIONER

## 2022-04-21 PROCEDURE — APPNB15 APP NON BILLABLE TIME 0-15 MINS: Performed by: NURSE PRACTITIONER

## 2022-04-22 NOTE — PROGRESS NOTES
DIAGNOSIS:  Left ankle hardware removal, syndesmosis screw. DATE OF SURGERY: 4/7/2022. HISTORY OF PRESENT ILLNESS:  Ms. Diego Lowers 28 y.o.  female who came in today for 2 weeks postoperative visit. The patient denies any significant pain in the left ankle. She rates her pain a 0/10 VAS. Kathrene Anabelle She has been WBAT. She still complains of stiffness. No numbness or tingling sensation. No fever or Chills. Denies smoking. She states that she developed a large blister over the incision underneath where the Steri-Strips were. She denies having a prior allergy to tape. PHYSICAL EXAMINATION:  The incision healing well, but there are areas of healing blisters over the incision site. No signs of any erythema or drainage, minimal swelling. She has no pain with the active or passive range of motion of the left ankle, good ROM. She has intact sensation distally, and she is neurovascularly intact. IMAGING:  Three views left ankle taken today in the office showed hardware syndesmosis screw removal, no fracture. The remaining hardware is intact showing no signs of failure. IMPRESSION:  2 weeks out from left ankle syndesmosis screw removal and doing very well. PLAN: She can go back to normal activity with no heavy impact activities for 6 weeks. I discussed with the patient that I think that she would really benefit from a course of physical therapy for further strengthening and stretching. An Rx for physical therapy was given to the patient. The patient will come back for a follow up in 3 months. At that time, we will take 3 views of the left ankle.       Chris Arevalo, APRN - CNP

## 2022-05-06 ENCOUNTER — HOSPITAL ENCOUNTER (OUTPATIENT)
Dept: PHYSICAL THERAPY | Age: 32
Setting detail: THERAPIES SERIES
Discharge: HOME | End: 2022-05-06
Payer: COMMERCIAL

## 2022-05-06 PROCEDURE — 97016 VASOPNEUMATIC DEVICE THERAPY: CPT | Performed by: PHYSICAL THERAPIST

## 2022-05-06 PROCEDURE — 97110 THERAPEUTIC EXERCISES: CPT | Performed by: PHYSICAL THERAPIST

## 2022-05-06 PROCEDURE — 97161 PT EVAL LOW COMPLEX 20 MIN: CPT | Performed by: PHYSICAL THERAPIST

## 2022-05-06 NOTE — PROGRESS NOTES
Obstructive Sleep Apnea (CELSO) Screening     Patient:  Poornima Reyes    YOB: 1990      Medical Record #:  [id number]                     Date:  5/6/2022     1. Are you a loud and/or regular snorer? []  Yes       [x] No    2. Have you been observed to gasp or stop breathing during sleep? []  Yes       [x] No    3. Do you feel tired or groggy upon awakening or do you awaken with a headache?           []  Yes       [] No    4. Are you often tired or fatigued during the wake time hours? []  Yes       [] No    5. Do you fall asleep sitting, reading, watching TV or driving? []  Yes       [] No    6. Do you often have problems with memory or concentration? []  Yes       [] No    **If patient's score is ? 3 they are considered high risk for CELSO. An Anesthesia provider will evaluate the patient and develop a plan of care the day of surgery. Note:  If the patient's BMI is more than 35 kg m¯² , has neck circumference > 40 cm, and/or high blood pressure the risk is greater (© American Sleep Apnea Association, 2006).

## 2022-05-06 NOTE — FLOWSHEET NOTE
[address] [place] [address]  Phone: [phone]   Fax: [phone]    Physical Therapy Treatment Note/ Progress Report:     Date:  2022    Patient Name:  Wade Lan    :  1990  MRN: [id number]  Restrictions/Precautions:    Medical/Treatment Diagnosis Information:  Diagnosis: S82.842A (ICD-10-CM) - Bimalleolar fracture, left, closed, initial encounter; 769 5523 (ICD-10-CM) - Syndesmotic disruption of ankle, initial encounter  Treatment Diagnosis: L ankle pain, decreased mobility, decreased LE strength limiting gait  Insurance/Certification information:  PT Insurance Information: [facility] [employer] Plus  Physician Information:  JUANPABLO Zhou - CNP   Plan of care signed (Y/N): []  Yes [x]  No     Date of Patient follow up with Physician:      Progress Report: []  Yes  [x]  No     Date Range for reporting period:  Beginnin22  Ending:     Progress report due (10 Rx/or 30 days whichever is less): visit #10 or 58 (date)     Recertification due (POC duration/ or 90 days whichever is less): visit #24 or 22 (date)     Visit # Insurance Allowable Auth required?  Date Range   1 30 []  Yes  [x]  No n/a       Latex Allergy:  [x]NO      []YES  Preferred Language for Healthcare:   [x]English       []other:    Functional Scale:           Date assessed:  FOTO physical FS primary measure score = 26; risk adjusted = 49  22    Pain level:  5-6/10     SUBJECTIVE:  See eval    OBJECTIVE: See eval      RESTRICTIONS/PRECAUTIONS: 21 L ankle ORIF, 22 hardware removal    No WB or ROM restrictions at this time    Exercises/Interventions:     Therapeutic Exercise (57169)  Resistance / level Sets/sec Reps Notes / Cues   Ankle alphabet  2     Seated gastroc stretch  20\" 3    Seated soleus stretch  20\" 3    Seated HR/TR  1 20    Towel scrunches  1 20    Seated windshield wipers  1 20           SLR - flexion  2 10 Cueing for quad activation Therapeutic Activities (13475)       Gait training                            Neuromuscular Re-ed (74314)       TKE                     Manual Intervention ([id number])       Knee mobs/PROM       Tib/Fem Mobs       Patella Mobs       Ankle mobs                         Modalities: vaso to L ankle 15' mod pressure    Pt. Education:  -pt educated on diagnosis, prognosis and expectations for rehab  -all pt questions were answered    Home Exercise Program:  Access Code: Kerrie Guaman  URL: [email or web address]  Date: 05/06/2022  Prepared by: Mega Campbell    Exercises  Seated Ankle Alphabet - 2 x daily - 7 x weekly - 2 reps  Long Sitting Calf Stretch with Strap - 2 x daily - 7 x weekly - 3 reps - 20s hold  Seated Soleus Towel Stretch - 2 x daily - 7 x weekly - 3 sets - 10 reps  Seated Heel Toe Raises - 2 x daily - 7 x weekly - 2 sets - 10 reps  Towel Scrunches - 2 x daily - 7 x weekly - 2 sets - 10 reps  Ankle Inversion Eversion Towel Slide - 2 x daily - 7 x weekly - 2 sets - 10 reps  Straight Leg Raise - 2 x daily - 7 x weekly - 2 sets - 10 reps      Therapeutic Exercise and NMR EXR  [x] (78370) Provided verbal/tactile cueing for activities related to strengthening, flexibility, endurance, ROM for improvements in LE, proximal hip, and core control with self care, mobility, lifting, ambulation.  [] (65245) Provided verbal/tactile cueing for activities related to improving balance, coordination, kinesthetic sense, posture, motor skill, proprioception  to assist with LE, proximal hip, and core control in self care, mobility, lifting, ambulation and eccentric single leg control.   [] (01248) Therapist is in constant attendance of 2 or more patients providing skilled therapy interventions, but not providing any significant amount of measurable one-on-one time to either patient, for improvements in LE, proximal hip, and core control in self care, mobility, lifting, ambulation and eccentric single leg control.      NMR and Therapeutic Activities:    [] (75866 or 28519) Provided verbal/tactile cueing for activities related to improving balance, coordination, kinesthetic sense, posture, motor skill, proprioception and motor activation to allow for proper function of core, proximal hip and LE with self care and ADLs  [] (04987) Gait Re-education- Provided training and instruction to the patient for proper LE, core and proximal hip recruitment and positioning and eccentric body weight control with ambulation re-education including up and down stairs     Home Exercise Program:    [x] (44420) Reviewed/Progressed HEP activities related to strengthening, flexibility, endurance, ROM of core, proximal hip and LE for functional self-care, mobility, lifting and ambulation/stair navigation   [] (59490)Reviewed/Progressed HEP activities related to improving balance, coordination, kinesthetic sense, posture, motor skill, proprioception of core, proximal hip and LE for self care, mobility, lifting, and ambulation/stair navigation      Manual Treatments:  PROM / STM / Oscillations-Mobs:  G-I, II, III, IV (PA's, Inf., Post.)  [] (49859) Provided manual therapy to mobilize LE, proximal hip and/or LS spine soft tissue/joints for the purpose of modulating pain, promoting relaxation,  increasing ROM, reducing/eliminating soft tissue swelling/inflammation/restriction, improving soft tissue extensibility and allowing for proper ROM for normal function with self care, mobility, lifting and ambulation. Modalities:  [x] (04568) Vasopneumatic compression: Utilized vasopneumatic compression to decrease edema / swelling for the purpose of improving mobility and quad tone / recruitment which will allow for increased overall function including but not limited to self-care, transfers, ambulation, and ascending / descending stairs.        Charges:  Timed Code Treatment Minutes: 24   Total Treatment Minutes: 65     [x] EVAL - LOW (70414)   [] EVAL - MOD (16093)  [] EVAL - HIGH (08676)  [] RE-EVAL (97846)  [x] QP(79065) x 2      [] Ionto  [] NMR (72048) x       [x] Vaso  [] Manual (56450) x       [] Ultrasound  [] TA x        [] Mech Traction (54290)  [] Aquatic Therapy x      [] ES (un) (91940):   [] Home Management Training x  [] ES(attended) (46361)   [] Dry Needling 1-2 muscles (68647):  [] Dry Needling 3+ muscles (459105  [] Group:      [] Other:     GOALS:   Patient stated goal: walk normal  [] Progressing: [] Met: [] Not Met: [] Adjusted    Therapist goals for Patient:   Short Term Goals: To be achieved in: 2 weeks  1. Independent in HEP and progression per patient tolerance, in order to prevent re-injury. [] Progressing: [] Met: [] Not Met: [] Adjusted  2. Patient will have a decrease in pain <3/10 to facilitate improvement in movement, function, and ADLs as indicated by Functional Deficits. [] Progressing: [] Met: [] Not Met: [] Adjusted    Long Term Goals: To be achieved in: 10-12 weeks  1. FOTO score of at least 59 to assist with reaching prior level of function. [] Progressing: [] Met: [] Not Met: [] Adjusted  2. Patient will demonstrate increased AROM to at least L ankle DF 5, PF 40 to allow for proper joint functioning as indicated by patients Functional Deficits. [] Progressing: [] Met: [] Not Met: [] Adjusted  3. Patient will demonstrate an increase in Strength to at least 4+/5 throughout as well as good proximal hip strength and control to allow for proper functional mobility as indicated by patients Functional Deficits. [] Progressing: [] Met: [] Not Met: [] Adjusted  4. Patient will return to functional activities including walking on even ground with minimal deviations and no AD without increased symptoms or restriction. [] Progressing: [] Met: [] Not Met: [] Adjusted  5. Patient will return to reciprocal stairs without increased symptoms.    [] Progressing: [] Met: [] Not Met: [] Adjusted     Overall Progression Towards Functional goals/ Treatment Progress Update:  [] Patient is progressing as expected towards functional goals listed. [] Progression is slowed due to complexities/Impairments listed. [] Progression has been slowed due to co-morbidities. [x] Plan just implemented, too soon to assess goals progression <30days   [] Goals require adjustment due to lack of progress  [] Patient is not progressing as expected and requires additional follow up with physician  [] Other    Persisting Functional Limitations/Impairments:  []Sitting [x]Standing   [x]Walking [x]Stairs   [x]Transfers [x]ADLs   [x]Squatting/bending []Kneeling  [x]Housework []Job related tasks  [x]Driving [x]Sports/Recreation   [x]Sleeping []Other:    ASSESSMENT:  See eval  Treatment/Activity Tolerance:  [x] Pt able to complete treatment [] Patient limited by fatique  [x] Patient limited by pain  [] Patient limited by other medical complications  [] Other:     Prognosis:  [x] Good [] Fair  [] Poor    Patient Requires Follow-up: [x] Yes  [] No    Return to Play:    [x]  N/A   []  Stage 1: Intro to Strength   []  Stage 2: Return to Run and Strength   []  Stage 3: Return to Jump and Strength   []  Stage 4: Dynamic Strength and Agility   []  Stage 5: Sport Specific Training     []  Ready to Return to Play, Meets All Above Stages   []  Not Ready for Return to Sports   Comments:            PLAN: See eval. PT 1-2x / week for 10-12 weeks. [] Continue per plan of care [] Alter current plan (see comments)  [x] Plan of care initiated [] Hold pending MD visit [] Discharge    Electronically signed by: Izzy Monge, PT, DPT      Note: If patient does not return for scheduled/ recommended follow up visits, this note will serve as a discharge from care along with most recent update on progress.

## 2022-05-06 NOTE — PLAN OF CARE
[address] [address], [address] [place] [address] Drive  Phone: [phone]   Fax: [phone]                                                       Physical Therapy Certification    Dear   JUANPABLO Wood CNP,    We had the pleasure of evaluating the following patient for physical therapy services at [address] [place] [address]. A summary of our findings can be found in the initial assessment below. This includes our plan of care. If you have any questions or concerns regarding these findings, please do not hesitate to contact me at the office phone number checked above. Thank you for the referral.       Physician Signature:_______________________________Date:__________________  By signing above (or electronic signature), therapists plan is approved by physician      Patient: Danni Mendez   : 1990   MRN: [id number]  Referring Physician: JUANPABLO Wood CNP      Evaluation Date: 2022      Medical Diagnosis Information:  Diagnosis: S82.842A (ICD-10-CM) - Bimalleolar fracture, left, closed, initial encounter; 769 5523 (ICD-10-CM) - Syndesmotic disruption of ankle, initial encounter   Treatment Diagnosis: L ankle pain, decreased mobility, decreased LE strength limiting gait                                         Insurance information: PT Insurance Information: [facility] Choice Plus     Precautions/ Contra-indications: 21 L ankle ORIF, 22 hardware removal  Latex Allergy:  [x]NO      []YES  Preferred Language for Healthcare:   [x]English       []Other:    C-SSRS Triggered by Intake questionnaire (Past 2 wk assessment ):   [x] No, Questionnaire did not trigger screening.   [] Yes, Patient intake triggered C-SSRS Screening     [] Completed, no further action required.    [] Completed, PCP notified via Epic    SUBJECTIVE: Patient stated complaint: pt. States that she was helping her friend with [employer] decorations last November and tripped on some shoes that were on a step; 11/22/21 L ankle ORIF, 4/7/22 hardware removal; pt. States that currently she has pain and swelling that is limiting her ability to walk normally and she feels that this is affecting her back as well; pt. Notes that she is having difficulty doing the stairs to get to her basement; pt. Denies sleep disturbances; pt.  Has difficulty with walking and turning; difficulty crossing leg over to put on shoes    Relevant Medical History: anxiety  Functional Outcome: FOTO physical FS primary measure score = 26; Risk adjusted = 49    Pain Scale: 5-6/10  Easing factors: tylenol, Ibuprofen, ice, elevation  Provocative factors: pushing off with foot for walking, prolonged standing    Type: [x]Constant   []Intermittent  []Radiating [x]Localized []other:     Numbness/Tingling: occasional numbness in toes with increased effusion    Occupation/School: student - studying English    Living Status/Prior Level of Function:Prior to this injury / incident, pt was independent with ADLs and IADLs, exercise at the gym (yoga, radha), walking for exercise, hiking      OBJECTIVE:   Palpation: L ankle globally tender to palpation    Functional Mobility/Transfers: increased time required    Posture: guarding L ankle    Bandages/Dressings/Incisions: incision closed and well healed, negative for signs and symptoms of infection    Gait: (include devices/WB status) FWB without AD, significant antalgic gait with quad avoidance and decreased push off on L       PROM AROM    L R L R   Knee Flexion   [facility]   Knee Extension   [facility]   Dorsiflexion    -8 5   Plantarflexion    20 40   Inversion    12 30   Eversion    15 26       Strength (0-5) / Myotomes Left Right   Hip Flexion - seated (L1-2) 4 4+   Hip Abduction 4 4   Quads (L2-4) 4 5   Hamstrings 4 4+   Ankle Dorsiflexion (L4-5) 3 4+   Ankle Plantarflexion (S1-2) 2 4+   Ankle Inversion 3+ 4+ Ankle Eversion (S1-2) 3+ 4+   Great Toe Extension (L5)          Girth (cm)     Figure 8 55.5 53       Balance: L knee giving way with attempted SLS                         [x] Patient history, allergies, meds reviewed. Medical chart reviewed. See intake form. Review Of Systems (ROS):  [x]Performed Review of systems (Integumentary, CardioPulmonary, Neurological) by intake and observation. Intake form has been scanned into medical record. Patient has been instructed to contact their primary care physician regarding ROS issues if not already being addressed at this time.       Co-morbidities/Complexities (which will affect course of rehabilitation):   []None        []Hx of COVID   Arthritic conditions   []Rheumatoid arthritis (M05.9)  []Osteoarthritis (M19.91)  []Gout   Cardiovascular conditions   []Hypertension (I10)  []Hyperlipidemia (E78.5)  []Angina pectoris (I20)  []Atherosclerosis (I70)  []Pacemaker  []Hx of CABG/stent/  cardiac surgeries   Musculoskeletal conditions   []Disc pathology   []Congenital spine pathologies   []Osteoporosis (M81.8)  []Osteopenia (M85.8)  []Scoliosis       Endocrine conditions   []Hypothyroid (E03.9)  []Hyperthyroid Gastrointestinal conditions   []Constipation (R26.35)   Metabolic conditions   []Morbid obesity (E66.01)  []Diabetes type 1(E10.65) or 2 (E11.65)   []Neuropathy (G60.9)     Cardio/Pulmonary conditions   []Asthma (J45)  []Coughing   []COPD (J44.9)  []CHF  []A-fib   Psychological Disorders  [x]Anxiety (F41.9)  []Depression (F32.9)   []Other:   Developmental Disorders  []Autism (F84.0)  []CP (G80)  []Down Syndrome (Q90.9)  []Developmental delay     Neurological conditions  []Prior Stroke (I69.30)  []Parkinson's (G20)  []Encephalopathy (G93.40)  []MS (G35)  []Post-polio (G14)  []SCI  []TBI  []ALS Other conditions  []Fibromyalgia (M79.7)  []Vertigo  []Syncope  []Kidney Failure  []Cancer      []currently undergoing                treatment  []Pregnancy  []Incontinence   Prior surgeries  []involved limb  []previous spinal surgery  [] section birth  []hysterectomy  []bowel / bladder surgery  []other relevant surgeries   []Other:              Barriers to/and or personal factors that will affect rehab potential:              []Age  []Sex    []Smoker              []Motivation/Lack of Motivation                        [x]Co-Morbidities              []Cognitive Function, education/learning barriers              [x]Environmental, home barriers              []profession/work barriers  []past PT/medical experience  [x]other: prolonged immobilization from surgery  Justification:     Falls Risk Assessment (30 days):   [x] Falls Risk assessed and no intervention required.   [] Falls Risk assessed and Patient requires intervention due to being higher risk   TUG score (>12s at risk):     [] Falls education provided, including        ASSESSMENT:   Functional Impairments:     [x]Noted lumbar/proximal hip/LE joint hypomobility   [x]Decreased LE functional ROM   [x]Decreased core/proximal hip strength and neuromuscular control   [x]Decreased LE functional strength   [x]Reduced balance/proprioceptive control   []other:      Functional Activity Limitations (from functional questionnaire and intake)   [x]Reduced ability to tolerate prolonged functional positions   [x]Reduced ability or difficulty with changes of positions or transfers between positions   [x]Reduced ability to maintain good posture and demonstrate good body mechanics with sitting, bending, and lifting   [x]Reduced ability to sleep   [x] Reduced ability or tolerance with driving and/or computer work   [x]Reduced ability to perform lifting, carrying tasks   [x]Reduced ability to squat   [x]Reduced ability to forward bend   [x]Reduced ability to ambulate prolonged functional periods/distances/surfaces   [x]Reduced ability to ascend/descend stairs   [x]Reduced ability to run, hop, cut or jump   []other:    Participation Restrictions   [x]Reduced participation in self care activities   [x]Reduced participation in home management activities   []Reduced participation in work activities   [x]Reduced participation in social activities. [x]Reduced participation in sport/recreation activities. Classification :    [x]Signs/symptoms consistent with post-surgical status including decreased ROM, strength and function.    []Signs/symptoms consistent with joint sprain/strain   []Signs/symptoms consistent with patella-femoral syndrome   []Signs/symptoms consistent with knee OA/hip OA   []Signs/symptoms consistent with internal derangement of knee/Hip   []Signs/symptoms consistent with functional hip weakness/NMR control      []Signs/symptoms consistent with tendinitis/tendinosis    []signs/symptoms consistent with pathology which may benefit from Dry needling      []other:      Prognosis/Rehab Potential:      []Excellent   [x]Good    []Fair   []Poor    Tolerance of evaluation/treatment:    []Excellent   [x]Good    [x]Fair   []Poor    Physical Therapy Evaluation Complexity Justification  [x] A history of present problem with:  [] no personal factors and/or comorbidities that impact the plan of care;  [x]1-2 personal factors and/or comorbidities that impact the plan of care  []3 personal factors and/or comorbidities that impact the plan of care  [x] An examination of body systems using standardized tests and measures addressing any of the following: body structures and functions (impairments), activity limitations, and/or participation restrictions;:  [] a total of 1-2 or more elements   [] a total of 3 or more elements   [x] a total of 4 or more elements   [x] A clinical presentation with:  [x] stable and/or uncomplicated characteristics   [] evolving clinical presentation with changing characteristics  [] unstable and unpredictable characteristics;   [x] Clinical decision making of [x] Low, [] moderate, [] high complexity using standardized patient assessment instrument and/or measurable assessment of functional outcome. [x] EVAL (LOW) 33096 (typically 15 minutes face-to-face)  [] EVAL (MOD) 80670 (typically 30 minutes face-to-face)  [] EVAL (HIGH) 76007 (typically 45 minutes face-to-face)  [] RE-EVAL     PLAN:   Frequency/Duration:  1-2 days per week for 10-12 Weeks:  Interventions:  [x]  Therapeutic exercise including: strength training, ROM, for Lower extremity and core   [x]  NMR activation and proprioception for LE, Glutes and Core   [x]  Manual therapy as indicated for LE, Hip and spine to include: Dry Needling/IASTM, STM, PROM, Gr I-IV mobilizations, manipulation. [x] Modalities as needed that may include: thermal agents, E-stim, Biofeedback, US, iontophoresis as indicated  [x] Patient education on joint protection, postural re-education, activity modification, progression of HEP. HEP instruction: Written HEP instructions provided and reviewed. GOALS:   Patient stated goal: walk normal  [] Progressing: [] Met: [] Not Met: [] Adjusted    Therapist goals for Patient:   Short Term Goals: To be achieved in: 2 weeks  1. Independent in HEP and progression per patient tolerance, in order to prevent re-injury. [] Progressing: [] Met: [] Not Met: [] Adjusted  2. Patient will have a decrease in pain <3/10 to facilitate improvement in movement, function, and ADLs as indicated by Functional Deficits. [] Progressing: [] Met: [] Not Met: [] Adjusted    Long Term Goals: To be achieved in: 10-12 weeks  1. FOTO score of at least 59 to assist with reaching prior level of function. [] Progressing: [] Met: [] Not Met: [] Adjusted  2. Patient will demonstrate increased AROM to at least L ankle DF 5, PF 40 to allow for proper joint functioning as indicated by patients Functional Deficits. [] Progressing: [] Met: [] Not Met: [] Adjusted  3.  Patient will demonstrate an increase in Strength to at least 4+/5 throughout as well as good proximal hip strength and control to allow for proper functional mobility as indicated by patients Functional Deficits. [] Progressing: [] Met: [] Not Met: [] Adjusted  4. Patient will return to functional activities including walking on even ground with minimal deviations and no AD without increased symptoms or restriction. [] Progressing: [] Met: [] Not Met: [] Adjusted  5. Patient will return to reciprocal stairs without increased symptoms.    [] Progressing: [] Met: [] Not Met: [] Adjusted     Electronically signed by:  Bladimir Farmer PT

## 2022-05-06 NOTE — PROGRESS NOTES

## 2022-05-11 ENCOUNTER — HOSPITAL ENCOUNTER (OUTPATIENT)
Dept: PHYSICAL THERAPY | Age: 32
Setting detail: THERAPIES SERIES
Discharge: HOME | End: 2022-05-11
Payer: COMMERCIAL

## 2022-05-11 PROCEDURE — 97530 THERAPEUTIC ACTIVITIES: CPT

## 2022-05-11 PROCEDURE — 97016 VASOPNEUMATIC DEVICE THERAPY: CPT

## 2022-05-11 PROCEDURE — 97110 THERAPEUTIC EXERCISES: CPT

## 2022-05-11 NOTE — FLOWSHEET NOTE
[address] [place] [address]  Phone: [phone]   Fax: [phone]    Physical Therapy Treatment Note/ Progress Report:     Date:  2022    Patient Name:  Zane Darby    :  1990  MRN: [id number]  Restrictions/Precautions:    Medical/Treatment Diagnosis Information:  Diagnosis: S82.842A (ICD-10-CM) - Bimalleolar fracture, left, closed, initial encounter; [id number] (ICD-10-CM) - Syndesmotic disruption of ankle, initial encounter  Treatment Diagnosis: L ankle pain, decreased mobility, decreased LE strength limiting gait  Insurance/Certification information:  PT Insurance Information: [facility] Choice Plus  Physician Information:  JUANPABLO Mullen CNP   Plan of care signed (Y/N): []  Yes [x]  No     Date of Patient follow up with Physician:      Progress Report: []  Yes  [x]  No     Date Range for reporting period:  Beginnin22  Ending:     Progress report due (10 Rx/or 30 days whichever is less): visit #10 or 05 (date)     Recertification due (POC duration/ or 90 days whichever is less): visit #24 or 22 (date)     Visit # Insurance Allowable Auth required? Date Range   2 30 []  Yes  [x]  No n/a       Latex Allergy:  [x]NO      []YES  Preferred Language for Healthcare:   [x]English       []other:    Functional Scale:           Date assessed:  TO physical FS primary measure score = 26; risk adjusted = 49  22    Pain level:  5/10     SUBJECTIVE:  Reports having soreness and stretching in ankle with walking. Ice and elevation helps with ankle soreness. Pt present to therapy this date in sandals. Has been doing HEP regularly at home and has most difficulty with heel raises due to ankle soreness. Has been using crutch at home to help offload L ankle, did not bring crutch to therapy today.      OBJECTIVE: See eval      RESTRICTIONS/PRECAUTIONS: 21 L ankle ORIF, 22 hardware removal    No WB or ROM restrictions at this time    Exercises/Interventions:     Therapeutic Exercise (96927)  Resistance / level Sets/sec Reps Notes / Cues   Recumbent bike  5'     Ankle alphabet  2     Seated gastroc stretch  20\" 3    Seated soleus stretch  20\" 3    Seated HR/TR  1 20    Towel scrunches  1 20    Seated windshield wipers  1 20    Long sit EOB hamstring stretch  30\" 3 Added 5/11   SLR - flexion  2 10 Cueing for quad activation   sidelying hip abduction  2 10 Added 5/11                                             Therapeutic Activities (85182)       Gait training       Education on use of crutch, tennis shoe selection for ankle stability, improving mobility to decrease gait compensations, aquatic exercises at gym for LE strength. 10'                   Neuromuscular Re-ed (08753)       TKE No resistance (unable to perform w/resist) 2 10 Added 5/11                 Manual Intervention (39668)              Tib/Fem Mobs  2'     Ankle mobs  5'                       Modalities: vaso to L ankle 15' mod pressure    Pt. Education:  -pt educated on diagnosis, prognosis and expectations for rehab  -all pt questions were answered    Home Exercise Program:  Access Code: Susana Cooper  URL: [email or web address]  Date: 05/06/2022  Prepared by: Andrea Roland    Exercises  Seated Ankle Alphabet - 2 x daily - 7 x weekly - 2 reps  Long Sitting Calf Stretch with Strap - 2 x daily - 7 x weekly - 3 reps - 20s hold  Seated Soleus Towel Stretch - 2 x daily - 7 x weekly - 3 sets - 10 reps  Seated Heel Toe Raises - 2 x daily - 7 x weekly - 2 sets - 10 reps  Towel Scrunches - 2 x daily - 7 x weekly - 2 sets - 10 reps  Ankle Inversion Eversion Towel Slide - 2 x daily - 7 x weekly - 2 sets - 10 reps  Straight Leg Raise - 2 x daily - 7 x weekly - 2 sets - 10 reps      Therapeutic Exercise and NMR EXR  [x] (74799) Provided verbal/tactile cueing for activities related to strengthening, flexibility, endurance, ROM for improvements in LE, proximal hip, and core control with self care, mobility, lifting, ambulation.  [] (81283) Provided verbal/tactile cueing for activities related to improving balance, coordination, kinesthetic sense, posture, motor skill, proprioception  to assist with LE, proximal hip, and core control in self care, mobility, lifting, ambulation and eccentric single leg control.   [] (36986) Therapist is in constant attendance of 2 or more patients providing skilled therapy interventions, but not providing any significant amount of measurable one-on-one time to either patient, for improvements in LE, proximal hip, and core control in self care, mobility, lifting, ambulation and eccentric single leg control.      NMR and Therapeutic Activities:    [] (88051 or 72448) Provided verbal/tactile cueing for activities related to improving balance, coordination, kinesthetic sense, posture, motor skill, proprioception and motor activation to allow for proper function of core, proximal hip and LE with self care and ADLs  [] (16917) Gait Re-education- Provided training and instruction to the patient for proper LE, core and proximal hip recruitment and positioning and eccentric body weight control with ambulation re-education including up and down stairs     Home Exercise Program:    [x] (21710) Reviewed/Progressed HEP activities related to strengthening, flexibility, endurance, ROM of core, proximal hip and LE for functional self-care, mobility, lifting and ambulation/stair navigation   [] (28575)Reviewed/Progressed HEP activities related to improving balance, coordination, kinesthetic sense, posture, motor skill, proprioception of core, proximal hip and LE for self care, mobility, lifting, and ambulation/stair navigation      Manual Treatments:  PROM / STM / Oscillations-Mobs:  G-I, II, III, IV (PA's, Inf., Post.)  [] (10969) Provided manual therapy to mobilize LE, proximal hip and/or LS spine soft tissue/joints for the purpose of modulating pain, promoting relaxation, increasing ROM, reducing/eliminating soft tissue swelling/inflammation/restriction, improving soft tissue extensibility and allowing for proper ROM for normal function with self care, mobility, lifting and ambulation. Modalities:  [x] (07028) Vasopneumatic compression: Utilized vasopneumatic compression to decrease edema / swelling for the purpose of improving mobility and quad tone / recruitment which will allow for increased overall function including but not limited to self-care, transfers, ambulation, and ascending / descending stairs. Charges:  Timed Code Treatment Minutes: 45   Total Treatment Minutes: 58     [] EVAL - LOW (60367)   [] EVAL - MOD (32443)  [] EVAL - HIGH (79332)  [] RE-EVAL (40929)  [x] IY(53413) x 2      [] Ionto   [] NMR (41112) x       [x] Vaso  [] Manual (20521) x       [] Ultrasound  [x] TA x  1      [] Mech Traction (73848)  [] Aquatic Therapy x      [] ES (un) (42680):   [] Home Management Training x  [] ES(attended) (35120)   [] Dry Needling 1-2 muscles (70419):  [] Dry Needling 3+ muscles (900552  [] Group:      [] Other:     GOALS:   Patient stated goal: walk normal  [] Progressing: [] Met: [] Not Met: [] Adjusted    Therapist goals for Patient:   Short Term Goals: To be achieved in: 2 weeks  1. Independent in HEP and progression per patient tolerance, in order to prevent re-injury. [] Progressing: [] Met: [] Not Met: [] Adjusted  2. Patient will have a decrease in pain <3/10 to facilitate improvement in movement, function, and ADLs as indicated by Functional Deficits. [] Progressing: [] Met: [] Not Met: [] Adjusted    Long Term Goals: To be achieved in: 10-12 weeks  1. FOTO score of at least 59 to assist with reaching prior level of function. [] Progressing: [] Met: [] Not Met: [] Adjusted  2. Patient will demonstrate increased AROM to at least L ankle DF 5, PF 40 to allow for proper joint functioning as indicated by patients Functional Deficits.    [] Progressing: [] Met: [] Not Met: [] Adjusted  3. Patient will demonstrate an increase in Strength to at least 4+/5 throughout as well as good proximal hip strength and control to allow for proper functional mobility as indicated by patients Functional Deficits. [] Progressing: [] Met: [] Not Met: [] Adjusted  4. Patient will return to functional activities including walking on even ground with minimal deviations and no AD without increased symptoms or restriction. [] Progressing: [] Met: [] Not Met: [] Adjusted  5. Patient will return to reciprocal stairs without increased symptoms. [] Progressing: [] Met: [] Not Met: [] Adjusted     Overall Progression Towards Functional goals/ Treatment Progress Update:  [] Patient is progressing as expected towards functional goals listed. [] Progression is slowed due to complexities/Impairments listed. [] Progression has been slowed due to co-morbidities. [x] Plan just implemented, too soon to assess goals progression <30days   [] Goals require adjustment due to lack of progress  [] Patient is not progressing as expected and requires additional follow up with physician  [] Other    Persisting Functional Limitations/Impairments:  []Sitting [x]Standing   [x]Walking [x]Stairs   [x]Transfers [x]ADLs   [x]Squatting/bending []Kneeling  [x]Housework []Job related tasks  [x]Driving [x]Sports/Recreation   [x]Sleeping []Other:    ASSESSMENT:  Pt with increased number of questions this date regarding popping in ankle, appropriate tennis shoe selections for stability as well as requiring education on importance of using crutch for ambulation to decrease gait compensations/low back pain and importance of wearing tennis shoes instead of sandals for ankle support. Pt also asking questions about going to aquatic classes at gym for increased LE exercise and general activity since walking/running are limited at this time.   Educated pt on ability to perform LE exercises in water for improved strength in LE.  Pt presents with significant posterior chain muscle tightness through hamstrings and calf muscles as well as significant weakness through quad and hip flexor muscles anteriorly as well as weakness in hips on L side. Pt significantly challenged by exercise routine this date due to LE weakness with cueing needed to maintain proper mechanics with SLR supine. Continue with skilled therapy to improve LE strength throughout whole L LE due to significant overall weakness as well as improving L ankle ROM to return to recreational activity without restrictions and progress toward established goals. Treatment/Activity Tolerance:  [x] Pt able to complete treatment [] Patient limited by fatique  [x] Patient limited by pain  [] Patient limited by other medical complications  [] Other:     Prognosis:  [x] Good [] Fair  [] Poor    Patient Requires Follow-up: [x] Yes  [] No    Return to Play:    [x]  N/A   []  Stage 1: Intro to Strength   []  Stage 2: Return to Run and Strength   []  Stage 3: Return to Jump and Strength   []  Stage 4: Dynamic Strength and Agility   []  Stage 5: Sport Specific Training     []  Ready to Return to Play, Meets All Above Stages   []  Not Ready for Return to Sports   Comments:            PLAN: See eval. PT 1-2x / week for 10-12 weeks. [] Continue per plan of care [] Alter current plan (see comments)  [x] Plan of care initiated [] Hold pending MD visit [] Discharge    Electronically signed by: Brody Aldridge, PTA [id number]      Note: If patient does not return for scheduled/ recommended follow up visits, this note will serve as a discharge from care along with most recent update on progress.

## 2022-05-12 ENCOUNTER — ANESTHESIA EVENT (OUTPATIENT)
Dept: ENDOSCOPY | Age: 32
End: 2022-05-12
Payer: COMMERCIAL

## 2022-05-12 ENCOUNTER — ANESTHESIA (OUTPATIENT)
Dept: ENDOSCOPY | Age: 32
End: 2022-05-12
Payer: COMMERCIAL

## 2022-05-12 ENCOUNTER — HOSPITAL ENCOUNTER (OUTPATIENT)
Age: 32
Setting detail: OUTPATIENT SURGERY
Discharge: HOME | End: 2022-05-12
Attending: INTERNAL MEDICINE | Admitting: INTERNAL MEDICINE
Payer: COMMERCIAL

## 2022-05-12 VITALS
WEIGHT: 230 LBS | HEART RATE: 74 BPM | OXYGEN SATURATION: 96 % | DIASTOLIC BLOOD PRESSURE: 82 MMHG | SYSTOLIC BLOOD PRESSURE: 131 MMHG | RESPIRATION RATE: 18 BRPM | TEMPERATURE: 97.2 F | HEIGHT: 70 IN | BODY MASS INDEX: 32.93 KG/M2

## 2022-05-12 VITALS — DIASTOLIC BLOOD PRESSURE: 98 MMHG | SYSTOLIC BLOOD PRESSURE: 136 MMHG | OXYGEN SATURATION: 98 %

## 2022-05-12 DIAGNOSIS — R10.9 ABDOMINAL PAIN, UNSPECIFIED ABDOMINAL LOCATION: ICD-10-CM

## 2022-05-12 LAB — PREGNANCY, URINE: NEGATIVE

## 2022-05-12 PROCEDURE — 2580000003 HC RX 258: Performed by: INTERNAL MEDICINE

## 2022-05-12 PROCEDURE — 7100000010 HC PHASE II RECOVERY - FIRST 15 MIN: Performed by: INTERNAL MEDICINE

## 2022-05-12 PROCEDURE — 84703 CHORIONIC GONADOTROPIN ASSAY: CPT

## 2022-05-12 PROCEDURE — 7100000011 HC PHASE II RECOVERY - ADDTL 15 MIN: Performed by: INTERNAL MEDICINE

## 2022-05-12 PROCEDURE — 2580000003 HC RX 258: Performed by: NURSE ANESTHETIST, CERTIFIED REGISTERED

## 2022-05-12 PROCEDURE — 6370000000 HC RX 637 (ALT 250 FOR IP): Performed by: INTERNAL MEDICINE

## 2022-05-12 PROCEDURE — 3609012400 HC EGD TRANSORAL BIOPSY SINGLE/MULTIPLE: Performed by: INTERNAL MEDICINE

## 2022-05-12 PROCEDURE — 88305 TISSUE EXAM BY PATHOLOGIST: CPT

## 2022-05-12 PROCEDURE — 88342 IMHCHEM/IMCYTCHM 1ST ANTB: CPT

## 2022-05-12 PROCEDURE — 2709999900 HC NON-CHARGEABLE SUPPLY: Performed by: INTERNAL MEDICINE

## 2022-05-12 PROCEDURE — 3700000000 HC ANESTHESIA ATTENDED CARE: Performed by: INTERNAL MEDICINE

## 2022-05-12 PROCEDURE — 6360000002 HC RX W HCPCS: Performed by: NURSE ANESTHETIST, CERTIFIED REGISTERED

## 2022-05-12 RX ORDER — ONDANSETRON 2 MG/ML
INJECTION INTRAMUSCULAR; INTRAVENOUS PRN
Status: DISCONTINUED | OUTPATIENT
Start: 2022-05-12 | End: 2022-05-12 | Stop reason: SDUPTHER

## 2022-05-12 RX ORDER — PROPOFOL 10 MG/ML
INJECTION, EMULSION INTRAVENOUS PRN
Status: DISCONTINUED | OUTPATIENT
Start: 2022-05-12 | End: 2022-05-12 | Stop reason: SDUPTHER

## 2022-05-12 RX ORDER — SODIUM CHLORIDE, SODIUM LACTATE, POTASSIUM CHLORIDE, CALCIUM CHLORIDE 600; 310; 30; 20 MG/100ML; MG/100ML; MG/100ML; MG/100ML
INJECTION, SOLUTION INTRAVENOUS ONCE
Status: COMPLETED | OUTPATIENT
Start: 2022-05-12 | End: 2022-05-12

## 2022-05-12 RX ORDER — LIDOCAINE HYDROCHLORIDE 10 MG/ML
0.1 INJECTION, SOLUTION EPIDURAL; INFILTRATION; INTRACAUDAL; PERINEURAL
Status: DISCONTINUED | OUTPATIENT
Start: 2022-05-12 | End: 2022-05-12 | Stop reason: HOSPADM

## 2022-05-12 RX ORDER — SODIUM CHLORIDE, SODIUM LACTATE, POTASSIUM CHLORIDE, CALCIUM CHLORIDE 600; 310; 30; 20 MG/100ML; MG/100ML; MG/100ML; MG/100ML
INJECTION, SOLUTION INTRAVENOUS CONTINUOUS PRN
Status: DISCONTINUED | OUTPATIENT
Start: 2022-05-12 | End: 2022-05-12 | Stop reason: SDUPTHER

## 2022-05-12 RX ORDER — SIMETHICONE 20 MG/.3ML
EMULSION ORAL PRN
Status: DISCONTINUED | OUTPATIENT
Start: 2022-05-12 | End: 2022-05-12 | Stop reason: ALTCHOICE

## 2022-05-12 RX ADMIN — PROPOFOL 50 MG: 10 INJECTION, EMULSION INTRAVENOUS at 08:15

## 2022-05-12 RX ADMIN — ONDANSETRON 4 MG: 2 INJECTION INTRAMUSCULAR; INTRAVENOUS at 08:15

## 2022-05-12 RX ADMIN — PROPOFOL 50 MG: 10 INJECTION, EMULSION INTRAVENOUS at 08:13

## 2022-05-12 RX ADMIN — PROPOFOL 50 MG: 10 INJECTION, EMULSION INTRAVENOUS at 08:11

## 2022-05-12 RX ADMIN — SODIUM CHLORIDE, POTASSIUM CHLORIDE, SODIUM LACTATE AND CALCIUM CHLORIDE: 600; 310; 30; 20 INJECTION, SOLUTION INTRAVENOUS at 07:16

## 2022-05-12 RX ADMIN — SODIUM CHLORIDE, SODIUM LACTATE, POTASSIUM CHLORIDE, AND CALCIUM CHLORIDE: .6; .31; .03; .02 INJECTION, SOLUTION INTRAVENOUS at 08:09

## 2022-05-12 RX ADMIN — PROPOFOL 50 MG: 10 INJECTION, EMULSION INTRAVENOUS at 08:17

## 2022-05-12 ASSESSMENT — PAIN - FUNCTIONAL ASSESSMENT: PAIN_FUNCTIONAL_ASSESSMENT: NONE - DENIES PAIN

## 2022-05-12 NOTE — ANESTHESIA PRE PROCEDURE
Department of Anesthesiology  Preprocedure Note       Name:  Warden Patterson   Age:  28 y.o.  :  1990                                          MRN:  [id number]         Date:  2022      Surgeon: Scar Her):  Harjit Bowie MD    Procedure: Procedure(s):  EGD    Medications prior to admission:   Prior to Admission medications    Medication Sig Start Date End Date Taking? Authorizing Provider   Probiotic Product (PROBIOTIC ADVANCED PO) Take by mouth   Yes Historical Provider, MD   Ergocalciferol (VITAMIN D2 PO) Take by mouth daily   Yes Historical Provider, MD   Multiple Vitamins-Minerals (THERAPEUTIC MULTIVITAMIN-MINERALS) tablet Take 1 tablet by mouth daily    Historical Provider, MD   vitamin B-12 (CYANOCOBALAMIN) 500 MCG tablet Take 500 mcg by mouth daily    Historical Provider, MD   omeprazole (PRILOSEC) 40 MG delayed release capsule Take 40 mg by mouth daily    Historical Provider, MD   buPROPion (WELLBUTRIN SR) 150 MG extended release tablet Take 150 mg by mouth daily    Historical Provider, MD       Current medications:    Current Facility-Administered Medications   Medication Dose Route Frequency Provider Last Rate Last Admin    lidocaine PF 1 % injection 0.1 mL  0.1 mL IntraDERmal Once PRN Harjit Bowie MD           Allergies:     Allergies   Allergen Reactions    Adhesive Tape Rash       Problem List:    Patient Active Problem List   Diagnosis Code    Bimalleolar fracture, left, closed, initial encounter S82.842A    Syndesmotic disruption of ankle, initial encounter S93.439A    Closed fracture dislocation of left ankle joint S82.892A    Retained orthopedic hardware Z96.9       Past Medical History:        Diagnosis Date    Anxiety     PONV (postoperative nausea and vomiting)     x 2 weeks post op       Past Surgical History:        Procedure Laterality Date    ANKLE FRACTURE SURGERY Left 2021    OPEN REDUCTION INTERNAL FIXATION LEFT ANKLE performed by Merlinda Goring, MD at [place] [address]  ANKLE SURGERY Left 4/7/2022    LEFT ANKLE SYNDESMOSIS SCREW REMOVAL performed by Gladys Leal MD at [place] [address] History:    Social History     Tobacco Use    Smoking status: Never Smoker    Smokeless tobacco: Never Used   Substance Use Topics    Alcohol use: Not Currently                                Counseling given: Not Answered      Vital Signs (Current):   Vitals:    05/06/22 1528 05/12/22 0709   BP:  128/78   Pulse:  89   Resp:  16   Temp:  97.2 °F (36.2 °C)   TempSrc:  Temporal   SpO2:  98%   Weight: 230 lb (104.3 kg) 230 lb (104.3 kg)   Height: 5' 10\" (1.778 m) 5' 10\" (1.778 m)                                              BP Readings from Last 3 Encounters:   05/12/22 128/78   04/07/22 122/87   04/07/22 103/65       NPO Status: Time of last liquid consumption: 2230                        Time of last solid consumption: 1930                        Date of last liquid consumption: 05/11/22                        Date of last solid food consumption: 05/11/22    BMI:   Wt Readings from Last 3 Encounters:   05/12/22 230 lb (104.3 kg)   04/21/22 229 lb (103.9 kg)   04/07/22 229 lb (103.9 kg)     Body mass index is 33 kg/m². CBC: No results found for: WBC, RBC, HGB, HCT, MCV, RDW, PLT    CMP: No results found for: NA, K, CL, CO2, BUN, CREATININE, GFRAA, AGRATIO, LABGLOM, GLUCOSE, GLU, PROT, CALCIUM, BILITOT, ALKPHOS, AST, ALT    POC Tests: No results for input(s): POCGLU, POCNA, POCK, POCCL, POCBUN, POCHEMO, POCHCT in the last 72 hours.     Coags: No results found for: PROTIME, INR, APTT    HCG (If Applicable):   Lab Results   Component Value Date    PREGTESTUR Negative 05/12/2022        ABGs: No results found for: PHART, PO2ART, HTW0XOK, KIV8BEZ, BEART, J8BUFLUR     Type & Screen (If Applicable):  No results found for: LABABO, LABRH    Drug/Infectious Status (If Applicable):  No results found for: HIV, HEPCAB    COVID-19 Screening (If Applicable):   Lab Results   Component Value Date COVID19 Not Detected 11/22/2021           Anesthesia Evaluation  Patient summary reviewed and Nursing notes reviewed   history of anesthetic complications: PONV. Airway: Mallampati: I  TM distance: >3 FB   Neck ROM: full  Mouth opening: > = 3 FB Dental: normal exam         Pulmonary:Negative Pulmonary ROS and normal exam  breath sounds clear to auscultation                             Cardiovascular:Negative CV ROS            Rhythm: regular  Rate: normal                    Neuro/Psych:   Negative Neuro/Psych ROS              GI/Hepatic/Renal: Neg GI/Hepatic/Renal ROS            Endo/Other: Negative Endo/Other ROS                    Abdominal:   (+) obese,           Vascular: negative vascular ROS. Other Findings:             Anesthesia Plan      MAC     ASA 2       Induction: intravenous. Anesthetic plan and risks discussed with patient. Plan discussed with CRNA.                   Jannet Keller MD   5/12/2022

## 2022-05-12 NOTE — ANESTHESIA POSTPROCEDURE EVALUATION
Department of Anesthesiology  Postprocedure Note    Patient: Johnathan Whitmore  MRN: [id number]  YOB: 1990  Date of evaluation: 5/12/2022  Time:  12:24 PM     Procedure Summary     Date: 05/12/22 Room / Location: [address] [place] [address] / [facility]    Anesthesia Start: Sujatae Gloss Anesthesia Stop: 5489    Procedure: EGD BIOPSY (N/A ) Diagnosis:       Abdominal pain, unspecified abdominal location      (ABDOMINAL PAIN)    Surgeons: Ossie Baumgarten, MD Responsible Provider: Patricia Cochran MD    Anesthesia Type: MAC ASA Status: 2          Anesthesia Type: No value filed. Abdelrahman Phase I: Abdelrahman Score: 10    Abdelrahman Phase II: Abdelrahman Score: 6    Last vitals: Reviewed and per EMR flowsheets.        Anesthesia Post Evaluation    Patient location during evaluation: PACU  Patient participation: complete - patient participated  Level of consciousness: awake and alert  Pain score: 0  Airway patency: patent  Nausea & Vomiting: no nausea and no vomiting  Complications: no  Cardiovascular status: blood pressure returned to baseline  Respiratory status: acceptable  Hydration status: stable

## 2022-05-12 NOTE — H&P
Gastroenterology Note             Pre-operative History and Physical    Patient: Valeen Severin  : 1990  CSN:     History Obtained From:  patient and/or guardian. HISTORY OF PRESENT ILLNESS:    The patient is a 28 y.o. female  here for EGD    Past Medical History:    Past Medical History:   Diagnosis Date    Anxiety     PONV (postoperative nausea and vomiting)     x 2 weeks post op     Past Surgical History:    Past Surgical History:   Procedure Laterality Date    ANKLE FRACTURE SURGERY Left 2021    OPEN REDUCTION INTERNAL FIXATION LEFT ANKLE performed by Klaus Goff MD at [address] [place], [place] [address] Left 2022    LEFT ANKLE SYNDESMOSIS SCREW REMOVAL performed by Klaus Goff MD at [facility]     Medications Prior to Admission:   No current facility-administered medications on file prior to encounter.      Current Outpatient Medications on File Prior to Encounter   Medication Sig Dispense Refill    Probiotic Product (PROBIOTIC ADVANCED PO) Take by mouth      Ergocalciferol (VITAMIN D2 PO) Take by mouth daily      Multiple Vitamins-Minerals (THERAPEUTIC MULTIVITAMIN-MINERALS) tablet Take 1 tablet by mouth daily      vitamin B-12 (CYANOCOBALAMIN) 500 MCG tablet Take 500 mcg by mouth daily      omeprazole (PRILOSEC) 40 MG delayed release capsule Take 40 mg by mouth daily      buPROPion (WELLBUTRIN SR) 150 MG extended release tablet Take 150 mg by mouth daily          Allergies:  Adhesive tape      Social History:   Social History     Tobacco Use    Smoking status: Never Smoker    Smokeless tobacco: Never Used   Substance Use Topics    Alcohol use: Not Currently     Family History:   Family History   Problem Relation Age of Onset    No Known Problems Mother     High Blood Pressure Father        PHYSICAL EXAM:      /78   Pulse 89   Temp 97.2 °F (36.2 °C) (Temporal)   Resp 16   Ht 5' 10\" (1.778 m)   Wt 230 lb (104.3 kg)   SpO2 98%   BMI 33.00 kg/m²  I Heart:   RRR, normal s1s2    Lungs:  CTA bilat,  Normal effort    Abdomen:   NT, ND      ASA Grade:  ASA 2 - Patient with mild systemic disease with no functional limitations    Mallampati Class: 1          ASSESSMENT AND PLAN:    1. Patient is a 28 y.o. female here for EGD with MAC.   2.  Procedure options, risks and benefits reviewed with patient. Patient expresses understanding.     Newman Olszewski, MD,   [facility]  5/12/2022

## 2022-05-12 NOTE — PROCEDURES
Endoscopy Note    Patient: Michael Razo  : 1990  CSN:     Procedure: Esophagogastroduodenoscopy with biopsy    Date:  2022     Surgeon:  Nissa Tena MD     Referring Physician:  Dr. Everlena Kehr    Preoperative Diagnosis:  Abdominal discomfort, belching, bloating    Postoperative Diagnosis:  Mild esophagitis, otherwise normal endoscopy    Anesthesia:  Propofol    EBL: <5 mL    Indications: This is a 28y.o. year old female who presents today with New-onset dyspepsia. Description of Procedure:  Informed consent was obtained from the patient after explanation of indications, benefits and possible risks and complications of the procedure. The patient was then taken to the endoscopy suite, placed in the left lateral decubitus position and the above IV sedation was administrered. The Olympus videoendoscope was passed through the hypopharynx into the esophagus. The scope was advanced all the way to the duodenum. The mucosa in the duodenal bulb, post bulbar region in the descending duodenum appeared normal, biopsies were taken to rule out celiac disease. The mucosa in the antrum appeared normal.  The mucosa in the remaining part of the stomach and retroflexion appeared normal, biopsies were taken to rule out Hpylori bacteria. The GE junction was at around 40cms. The GE Junction was slightly irregular, biopsies were taken to rule out esophagitis. The scope was withdrawn and the procedure was terminated. Gastric or Duodenal ulcer present: No      The patient tolerated the procedure well and was taken to the post anesthesia care unit in good condition. Impression:  -Mild esophagitis      Recommendations: -Await biopsy results, trial of over the counter IB-Guard one tablet by mouth daily and use prilosec as needed. Patient called with results, told her chronic gastritis, asked her to remain on omeprazole for an additional 8-12 weeks and follow up with pcp.     Nissa Tena MD, MD  [address]

## 2022-05-13 ENCOUNTER — HOSPITAL ENCOUNTER (OUTPATIENT)
Dept: PHYSICAL THERAPY | Age: 32
Setting detail: THERAPIES SERIES
Discharge: HOME | End: 2022-05-13
Payer: COMMERCIAL

## 2022-05-13 PROCEDURE — 97530 THERAPEUTIC ACTIVITIES: CPT | Performed by: PHYSICAL THERAPIST

## 2022-05-13 PROCEDURE — 97110 THERAPEUTIC EXERCISES: CPT | Performed by: PHYSICAL THERAPIST

## 2022-05-13 PROCEDURE — 97140 MANUAL THERAPY 1/> REGIONS: CPT | Performed by: PHYSICAL THERAPIST

## 2022-05-13 PROCEDURE — 97016 VASOPNEUMATIC DEVICE THERAPY: CPT | Performed by: PHYSICAL THERAPIST

## 2022-05-13 NOTE — FLOWSHEET NOTE
[address] [facility]  Phone: [phone]   Fax: [phone]    Physical Therapy Treatment Note/ Progress Report:     Date:  2022    Patient Name:  Juan Daniel Marinelli    :  1990  MRN: [id number]  Restrictions/Precautions:    Medical/Treatment Diagnosis Information:  Diagnosis: S82.842A (ICD-10-CM) - Bimalleolar fracture, left, closed, initial encounter; 769 5523 (ICD-10-CM) - Syndesmotic disruption of ankle, initial encounter  Treatment Diagnosis: L ankle pain, decreased mobility, decreased LE strength limiting gait  Insurance/Certification information:  PT Insurance Information: [facility] Choice Plus  Physician Information:  JUANPABLO Brambila CNP   Plan of care signed (Y/N): []  Yes [x]  No     Date of Patient follow up with Physician:      Progress Report: []  Yes  [x]  No     Date Range for reporting period:  Beginnin22  Ending:     Progress report due (10 Rx/or 30 days whichever is less): visit #10 or  (date)     Recertification due (POC duration/ or 90 days whichever is less): visit #24 or 22 (date)     Visit # Insurance Allowable Auth required? Date Range   3 30 []  Yes  [x]  No n/a       Latex Allergy:  [x]NO      []YES  Preferred Language for Healthcare:   [x]English       []other:    Functional Scale:           Date assessed:  FOTO physical FS primary measure score = 26; risk adjusted = 49  22    Pain level:  0/10     SUBJECTIVE:  Denies pain at present time, states having some minor muscle soreness from last session. Has been using crutch consistently with decreased pain in leg and back.      OBJECTIVE:     :       RESTRICTIONS/PRECAUTIONS: 21 L ankle ORIF, 22 hardware removal    No WB or ROM restrictions at this time    Exercises/Interventions:     Therapeutic Exercise (49392)  Resistance / level Sets/sec Reps Notes / Cues   Recumbent bike  5'     Ankle alphabet  2     Seated gastroc stretch  20\" 3    Seated soleus stretch  20\" 3    Seated HR/TR  1 20    Towel scrunches  1 20    Seated windshield wipers  1 20 Limited range due to pain   Long sit EOB hamstring stretch  30\" 3 Added 5/11   SLR - flexion  2 10 Cueing for quad activation   sidelying hip abduction  2 10 Added 5/11                                             Therapeutic Activities (04011)       Gait training with single crutch, re-fit crutch  5'     Sit to stand chair 2 10                  Neuromuscular Re-ed (47171)       TKE No resistance  2 10 Added 5/11                 Manual Intervention (52068)              Tib/fib Mobs  3'     Ankle mobs/PROM  6'                       Modalities: vaso to L ankle 15' mod pressure    Pt. Education:  -pt educated on diagnosis, prognosis and expectations for rehab  -all pt questions were answered    Home Exercise Program:  Access Code: Aryan Callas  URL: [email or web address]  Date: 05/06/2022  Prepared by: Edyta Montiel    Exercises  Seated Ankle Alphabet - 2 x daily - 7 x weekly - 2 reps  Long Sitting Calf Stretch with Strap - 2 x daily - 7 x weekly - 3 reps - 20s hold  Seated Soleus Towel Stretch - 2 x daily - 7 x weekly - 3 sets - 10 reps  Seated Heel Toe Raises - 2 x daily - 7 x weekly - 2 sets - 10 reps  Towel Scrunches - 2 x daily - 7 x weekly - 2 sets - 10 reps  Ankle Inversion Eversion Towel Slide - 2 x daily - 7 x weekly - 2 sets - 10 reps  Straight Leg Raise - 2 x daily - 7 x weekly - 2 sets - 10 reps      Therapeutic Exercise and NMR EXR  [x] (60621) Provided verbal/tactile cueing for activities related to strengthening, flexibility, endurance, ROM for improvements in LE, proximal hip, and core control with self care, mobility, lifting, ambulation.  [] (67821) Provided verbal/tactile cueing for activities related to improving balance, coordination, kinesthetic sense, posture, motor skill, proprioception  to assist with LE, proximal hip, and core control in self care, mobility, lifting, ambulation and eccentric single leg control.   [] (37569) Therapist is in constant attendance of 2 or more patients providing skilled therapy interventions, but not providing any significant amount of measurable one-on-one time to either patient, for improvements in LE, proximal hip, and core control in self care, mobility, lifting, ambulation and eccentric single leg control. NMR and Therapeutic Activities:    [] (67524 or 48950) Provided verbal/tactile cueing for activities related to improving balance, coordination, kinesthetic sense, posture, motor skill, proprioception and motor activation to allow for proper function of core, proximal hip and LE with self care and ADLs  [] (15886) Gait Re-education- Provided training and instruction to the patient for proper LE, core and proximal hip recruitment and positioning and eccentric body weight control with ambulation re-education including up and down stairs     Home Exercise Program:    [x] (37931) Reviewed/Progressed HEP activities related to strengthening, flexibility, endurance, ROM of core, proximal hip and LE for functional self-care, mobility, lifting and ambulation/stair navigation   [] (55813)Reviewed/Progressed HEP activities related to improving balance, coordination, kinesthetic sense, posture, motor skill, proprioception of core, proximal hip and LE for self care, mobility, lifting, and ambulation/stair navigation      Manual Treatments:  PROM / STM / Oscillations-Mobs:  G-I, II, III, IV (PA's, Inf., Post.)  [x] (66176) Provided manual therapy to mobilize LE, proximal hip and/or LS spine soft tissue/joints for the purpose of modulating pain, promoting relaxation,  increasing ROM, reducing/eliminating soft tissue swelling/inflammation/restriction, improving soft tissue extensibility and allowing for proper ROM for normal function with self care, mobility, lifting and ambulation.      Modalities:  [x] (07131) Vasopneumatic compression: Utilized vasopneumatic compression to decrease edema / swelling for the purpose of improving mobility and quad tone / recruitment which will allow for increased overall function including but not limited to self-care, transfers, ambulation, and ascending / descending stairs. Charges:  Timed Code Treatment Minutes: 46   Total Treatment Minutes: 64     [] EVAL - LOW (96059)   [] EVAL - MOD (68995)  [] EVAL - HIGH (73331)  [] RE-EVAL (63249)  [x] RW(49442) x 1      [] Ionto   [] NMR (28875) x       [x] Vaso  [x] Manual (78745) x 1      [] Ultrasound  [x] TA x  1      [] Mech Traction (75014)  [] Aquatic Therapy x      [] ES (un) (41158):   [] Home Management Training x  [] ES(attended) (82511)   [] Dry Needling 1-2 muscles (60508):  [] Dry Needling 3+ muscles (645977  [] Group:      [] Other:     GOALS:   Patient stated goal: walk normal  [] Progressing: [] Met: [] Not Met: [] Adjusted    Therapist goals for Patient:   Short Term Goals: To be achieved in: 2 weeks  1. Independent in HEP and progression per patient tolerance, in order to prevent re-injury. [] Progressing: [] Met: [] Not Met: [] Adjusted  2. Patient will have a decrease in pain <3/10 to facilitate improvement in movement, function, and ADLs as indicated by Functional Deficits. [] Progressing: [] Met: [] Not Met: [] Adjusted    Long Term Goals: To be achieved in: 10-12 weeks  1. FOTO score of at least 59 to assist with reaching prior level of function. [] Progressing: [] Met: [] Not Met: [] Adjusted  2. Patient will demonstrate increased AROM to at least L ankle DF 5, PF 40 to allow for proper joint functioning as indicated by patients Functional Deficits. [] Progressing: [] Met: [] Not Met: [] Adjusted  3. Patient will demonstrate an increase in Strength to at least 4+/5 throughout as well as good proximal hip strength and control to allow for proper functional mobility as indicated by patients Functional Deficits. [] Progressing: [] Met: [] Not Met: [] Adjusted  4.  Patient will return to functional activities including walking on even ground with minimal deviations and no AD without increased symptoms or restriction. [] Progressing: [] Met: [] Not Met: [] Adjusted  5. Patient will return to reciprocal stairs without increased symptoms. [] Progressing: [] Met: [] Not Met: [] Adjusted     Overall Progression Towards Functional goals/ Treatment Progress Update:  [] Patient is progressing as expected towards functional goals listed. [] Progression is slowed due to complexities/Impairments listed. [] Progression has been slowed due to co-morbidities. [x] Plan just implemented, too soon to assess goals progression <30days   [] Goals require adjustment due to lack of progress  [] Patient is not progressing as expected and requires additional follow up with physician  [] Other    Persisting Functional Limitations/Impairments:  []Sitting [x]Standing   [x]Walking [x]Stairs   [x]Transfers [x]ADLs   [x]Squatting/bending []Kneeling  [x]Housework []Job related tasks  [x]Driving [x]Sports/Recreation   [x]Sleeping []Other:    ASSESSMENT:  Pt. Continues to progress in tolerance for therapy today. Pt. Has improved significantly in ankle DF and PF motion. Some pain persisting with inversion limiting range. Pt. Continues to present with weakness throughout L LE due to prolonged limited WB. Adjusted crutch height for correct fit. Pt. To continue to utilize single crutch for gait normalization. Continued vaso for post-operative pain and effusion. Pt. Requires continued progression of skilled therapy in order to restore functional strength and movement for decreased pain with daily activities.      Treatment/Activity Tolerance:  [x] Pt able to complete treatment [] Patient limited by fatique  [] Patient limited by pain  [] Patient limited by other medical complications  [] Other:     Prognosis:  [x] Good [] Fair  [] Poor    Patient Requires Follow-up: [x] Yes  [] No    Return to Play:    [x]  N/A   [] Stage 1: Intro to Strength   []  Stage 2: Return to Run and Strength   []  Stage 3: Return to Jump and Strength   []  Stage 4: Dynamic Strength and Agility   []  Stage 5: Sport Specific Training     []  Ready to Return to Play, Meets All Above Stages   []  Not Ready for Return to Sports   Comments:            PLAN: See eval. PT 1-2x / week for 10-12 weeks. [x] Continue per plan of care [] Alter current plan (see comments)  [] Plan of care initiated [] Hold pending MD visit [] Discharge    Electronically signed by: César Peña  [address], PT      Note: If patient does not return for scheduled/ recommended follow up visits, this note will serve as a discharge from care along with most recent update on progress.

## 2022-05-17 ENCOUNTER — HOSPITAL ENCOUNTER (OUTPATIENT)
Dept: PHYSICAL THERAPY | Age: 32
Setting detail: THERAPIES SERIES
Discharge: HOME | End: 2022-05-17
Payer: COMMERCIAL

## 2022-05-17 PROCEDURE — 97530 THERAPEUTIC ACTIVITIES: CPT

## 2022-05-17 PROCEDURE — 97140 MANUAL THERAPY 1/> REGIONS: CPT

## 2022-05-17 PROCEDURE — 97016 VASOPNEUMATIC DEVICE THERAPY: CPT

## 2022-05-17 PROCEDURE — 97110 THERAPEUTIC EXERCISES: CPT

## 2022-05-17 NOTE — FLOWSHEET NOTE
[address] [place] [address], [address] [place] [address],[address]  Phone: [phone]   Fax: [phone]    Physical Therapy Treatment Note/ Progress Report:     Date:  2022    Patient Name:  Michael Razo    :  1990  MRN: [id number]  Restrictions/Precautions:    Medical/Treatment Diagnosis Information:  Diagnosis: S82.842A (ICD-10-CM) - Bimalleolar fracture, left, closed, initial encounter; 769 5523 (ICD-10-CM) - Syndesmotic disruption of ankle, initial encounter  Treatment Diagnosis: L ankle pain, decreased mobility, decreased LE strength limiting gait  Insurance/Certification information:  PT Insurance Information: [facility] Choice Plus  Physician Information:  JUANPABLO Tapia CNP   Plan of care signed (Y/N): []  Yes [x]  No     Date of Patient follow up with Physician:      Progress Report: []  Yes  [x]  No     Date Range for reporting period:  Beginnin22  Ending:     Progress report due (10 Rx/or 30 days whichever is less): visit #10 or 91 (date)     Recertification due (POC duration/ or 90 days whichever is less): visit #24 or 22 (date)     Visit # Insurance Allowable Auth required? Date Range   4 30 []  Yes  [x]  No n/a       Latex Allergy:  [x]NO      []YES  Preferred Language for Healthcare:   [x]English       []other:    Functional Scale:           Date assessed:  FOTO physical FS primary measure score = 26; risk adjusted = 49  22    Pain level:  5-6/10 - in back of leg and ankle     SUBJECTIVE:  Reports having increased muscle soreness in back of leg from glutes down to back of leg. Has had some increased muscle soreness as well in ankle from exercises. Had some sharp pain with ankle motion, specifically moving ankle inward, has pain along inside of ankle but not consistent. Has also reported having some minor increased back pain recently with exercises as well.      OBJECTIVE:     RESTRICTIONS/PRECAUTIONS: 21 L ankle ORIF, 22 hardware removal    No WB or ROM restrictions at this time    Exercises/Interventions:     Therapeutic Exercise (65813)  Resistance / level Sets/sec Reps Notes / Cues   Recumbent bike  5'     Ankle alphabet  2     Seated gastroc stretch  20\" 3    Seated soleus stretch  20\" 3     Standing HR/TR  1 20 ^5/17     1 20    Seated windshield wipers  1 20 Limited range due to pain   Long sit EOB hamstring stretch  30\" 3 Added 5/11   SLR - flexion  2 10 Cueing for quad activation   sidelying hip abduction  2 10 Added 5/11   Supine bridges  1 15 5/17 - cueing for glute activation and push through LE's. Therapeutic Activities (34953)         5'     Sit to stand chair 2 10    Forward step ups 4\" step 2 10 5/17          Neuromuscular Re-ed (87441)       TKE No resistance  2 10 Added 5/11                 Manual Intervention (83626)              Tib/fib Mobs  3'     Ankle mobs/PROM  6'                       Modalities: vaso to L ankle 15' mod pressure    Pt. Education:  -pt educated on diagnosis, prognosis and expectations for rehab  -all pt questions were answered    Home Exercise Program:  Access Code: Edna Lewis  URL: [email or web address]  Date: 05/06/2022  Prepared by: Kelly Current    Exercises  Seated Ankle Alphabet - 2 x daily - 7 x weekly - 2 reps  Long Sitting Calf Stretch with Strap - 2 x daily - 7 x weekly - 3 reps - 20s hold  Seated Soleus Towel Stretch - 2 x daily - 7 x weekly - 3 sets - 10 reps  Seated Heel Toe Raises - 2 x daily - 7 x weekly - 2 sets - 10 reps  Towel Scrunches - 2 x daily - 7 x weekly - 2 sets - 10 reps  Ankle Inversion Eversion Towel Slide - 2 x daily - 7 x weekly - 2 sets - 10 reps  Straight Leg Raise - 2 x daily - 7 x weekly - 2 sets - 10 reps      Therapeutic Exercise and NMR EXR  [x] (74611) Provided verbal/tactile cueing for activities related to strengthening, flexibility, endurance, ROM for improvements in LE, proximal hip, and core control with self care, mobility, lifting, ambulation.  [] (76101) Provided verbal/tactile cueing for activities related to improving balance, coordination, kinesthetic sense, posture, motor skill, proprioception  to assist with LE, proximal hip, and core control in self care, mobility, lifting, ambulation and eccentric single leg control.   [] (01688) Therapist is in constant attendance of 2 or more patients providing skilled therapy interventions, but not providing any significant amount of measurable one-on-one time to either patient, for improvements in LE, proximal hip, and core control in self care, mobility, lifting, ambulation and eccentric single leg control.      NMR and Therapeutic Activities:    [] (12438 or 17063) Provided verbal/tactile cueing for activities related to improving balance, coordination, kinesthetic sense, posture, motor skill, proprioception and motor activation to allow for proper function of core, proximal hip and LE with self care and ADLs  [] (55170) Gait Re-education- Provided training and instruction to the patient for proper LE, core and proximal hip recruitment and positioning and eccentric body weight control with ambulation re-education including up and down stairs     Home Exercise Program:    [x] (81930) Reviewed/Progressed HEP activities related to strengthening, flexibility, endurance, ROM of core, proximal hip and LE for functional self-care, mobility, lifting and ambulation/stair navigation   [] (60193)Reviewed/Progressed HEP activities related to improving balance, coordination, kinesthetic sense, posture, motor skill, proprioception of core, proximal hip and LE for self care, mobility, lifting, and ambulation/stair navigation      Manual Treatments:  PROM / STM / Oscillations-Mobs:  G-I, II, III, IV (PA's, Inf., Post.)  [x] (58549) Provided manual therapy to mobilize LE, proximal hip and/or LS spine soft tissue/joints for the purpose of modulating pain, promoting relaxation,  increasing ROM, reducing/eliminating soft tissue swelling/inflammation/restriction, improving soft tissue extensibility and allowing for proper ROM for normal function with self care, mobility, lifting and ambulation. Modalities:  [x] (03662) Vasopneumatic compression: Utilized vasopneumatic compression to decrease edema / swelling for the purpose of improving mobility and quad tone / recruitment which will allow for increased overall function including but not limited to self-care, transfers, ambulation, and ascending / descending stairs. Charges:  Timed Code Treatment Minutes: 45   Total Treatment Minutes: 65     [] EVAL - LOW (75046)   [] EVAL - MOD (47330)  [] EVAL - HIGH (47617)  [] RE-EVAL (58383)  [x] ZI(07186) x 1      [] Ionto   [] NMR (16952) x       [x] Vaso  [x] Manual (03609) x 1      [] Ultrasound  [x] TA x  1      [] Mech Traction (90312)  [] Aquatic Therapy x      [] ES (un) (68578):   [] Home Management Training x  [] ES(attended) (97863)   [] Dry Needling 1-2 muscles (80312):  [] Dry Needling 3+ muscles (505646  [] Group:      [] Other:     GOALS:   Patient stated goal: walk normal  [] Progressing: [] Met: [] Not Met: [] Adjusted    Therapist goals for Patient:   Short Term Goals: To be achieved in: 2 weeks  1. Independent in HEP and progression per patient tolerance, in order to prevent re-injury. [] Progressing: [] Met: [] Not Met: [] Adjusted  2. Patient will have a decrease in pain <3/10 to facilitate improvement in movement, function, and ADLs as indicated by Functional Deficits. [] Progressing: [] Met: [] Not Met: [] Adjusted    Long Term Goals: To be achieved in: 10-12 weeks  1. FOTO score of at least 59 to assist with reaching prior level of function. [] Progressing: [] Met: [] Not Met: [] Adjusted  2. Patient will demonstrate increased AROM to at least L ankle DF 5, PF 40 to allow for proper joint functioning as indicated by patients Functional Deficits. [] Progressing: [] Met: [] Not Met: [] Adjusted  3. Patient will demonstrate an increase in Strength to at least 4+/5 throughout as well as good proximal hip strength and control to allow for proper functional mobility as indicated by patients Functional Deficits. [] Progressing: [] Met: [] Not Met: [] Adjusted  4. Patient will return to functional activities including walking on even ground with minimal deviations and no AD without increased symptoms or restriction. [] Progressing: [] Met: [] Not Met: [] Adjusted  5. Patient will return to reciprocal stairs without increased symptoms. [] Progressing: [] Met: [] Not Met: [] Adjusted     Overall Progression Towards Functional goals/ Treatment Progress Update:  [] Patient is progressing as expected towards functional goals listed. [] Progression is slowed due to complexities/Impairments listed. [] Progression has been slowed due to co-morbidities. [x] Plan just implemented, too soon to assess goals progression <30days   [] Goals require adjustment due to lack of progress  [] Patient is not progressing as expected and requires additional follow up with physician  [] Other    Persisting Functional Limitations/Impairments:  []Sitting [x]Standing   [x]Walking [x]Stairs   [x]Transfers [x]ADLs   [x]Squatting/bending []Kneeling  [x]Housework []Job related tasks  [x]Driving [x]Sports/Recreation   [x]Sleeping []Other:    ASSESSMENT:   Upgraded exercises including forward step ups and standing heel raises. Pt hesitant to perform forward step ups however, able to perform exercise without increased pain. Demonstrates increased tenderness with manual therapy along posterior tib muscle as well as plantar surface of L foot with addition of big toe stretch this date. Discussed performing self STM and adding big toe stretch to home program to improve flexibility and ambulation.  Continue with upgrades as tolerated to promote increased weight bearing tolerance for prolonged walking, standing and functional daily tasks without pain, restrictions or limitations. Treatment/Activity Tolerance:  [x] Pt able to complete treatment [] Patient limited by fatique  [] Patient limited by pain  [] Patient limited by other medical complications  [] Other:     Prognosis:  [x] Good [] Fair  [] Poor    Patient Requires Follow-up: [x] Yes  [] No    Return to Play:    [x]  N/A   []  Stage 1: Intro to Strength   []  Stage 2: Return to Run and Strength   []  Stage 3: Return to Jump and Strength   []  Stage 4: Dynamic Strength and Agility   []  Stage 5: Sport Specific Training   []  Ready to Return to Play, Meets All Above Stages   []  Not Ready for Return to Sports   Comments:            PLAN: See eval. PT 1-2x / week for 10-12 weeks. [x] Continue per plan of care [] Alter current plan (see comments)  [] Plan of care initiated [] Hold pending MD visit [] Discharge    Electronically signed by: Lawrence Orona, PTA [id number]      Note: If patient does not return for scheduled/ recommended follow up visits, this note will serve as a discharge from care along with most recent update on progress.

## 2022-05-19 ENCOUNTER — HOSPITAL ENCOUNTER (OUTPATIENT)
Dept: PHYSICAL THERAPY | Age: 32
Setting detail: THERAPIES SERIES
Discharge: HOME | End: 2022-05-19
Payer: COMMERCIAL

## 2022-05-19 PROCEDURE — 97016 VASOPNEUMATIC DEVICE THERAPY: CPT | Performed by: PHYSICAL THERAPIST

## 2022-05-19 PROCEDURE — 97530 THERAPEUTIC ACTIVITIES: CPT | Performed by: PHYSICAL THERAPIST

## 2022-05-19 PROCEDURE — 97110 THERAPEUTIC EXERCISES: CPT | Performed by: PHYSICAL THERAPIST

## 2022-05-19 PROCEDURE — 97140 MANUAL THERAPY 1/> REGIONS: CPT | Performed by: PHYSICAL THERAPIST

## 2022-05-19 NOTE — FLOWSHEET NOTE
[address] [place] [address]  Phone: [phone]   Fax: [phone]    Physical Therapy Treatment Note/ Progress Report:     Date:  2022    Patient Name:  Selene Martinez    :  1990  MRN: [id number]  Restrictions/Precautions:    Medical/Treatment Diagnosis Information:  Diagnosis: S82.842A (ICD-10-CM) - Bimalleolar fracture, left, closed, initial encounter; 769 5523 (ICD-10-CM) - Syndesmotic disruption of ankle, initial encounter  Treatment Diagnosis: L ankle pain, decreased mobility, decreased LE strength limiting gait  Insurance/Certification information:  PT Insurance Information: [facility] Plus  Physician Information:  JUANPABLO Perry CNP   Plan of care signed (Y/N): []  Yes [x]  No     Date of Patient follow up with Physician:       Progress Report: []  Yes  [x]  No     Date Range for reporting period:  Beginnin22  Ending:     Progress report due (10 Rx/or 30 days whichever is less): visit #10 or  (date)     Recertification due (POC duration/ or 90 days whichever is less): visit #24 or 22 (date)     Visit # Insurance Allowable Auth required? Date Range    30 []  Yes  [x]  No n/a       Latex Allergy:  [x]NO      []YES  Preferred Language for Healthcare:   [x]English       []other:    Functional Scale:           Date assessed:  FOTO physical FS primary measure score = 26; risk adjusted = 49  22    Pain level:  4/10  ankle     SUBJECTIVE:  Pt. Reports that she does not have as much pain in her ankle at this time. She continues to be a little sore throughout her leg from the exercise.       OBJECTIVE:     Girth (cm) L - pre vaso / post vaso R - pre-vaso / post-vaso   Figure 8 54.4 53.0            RESTRICTIONS/PRECAUTIONS: 21 L ankle ORIF, 22 hardware removal    No WB or ROM restrictions at this time    Exercises/Interventions:     Therapeutic Exercise (63079)  Resistance / level Sets/sec Reps Notes / Cues   Recumbent bike  5'     Ankle alphabet  2     IB gastroc stretch  20\" 3    IB soleus stretch  20\" 3    Standing HR/TR  1 20 ^5/17     1 20    Long sit EOB hamstring stretch  30\" 3 Added 5/11   SLR - flexion 2# 2 10 Cueing for quad activation   sidelying hip abduction 2# 2 10 Added 5/11   Supine bridges  1 15 5/17 - cueing for glute activation and push through LE's. Ankle inversion/eversion blue 2 10                                Therapeutic Activities (02702)         5'        Forward step ups 4\" step 2 10 5/17   Leg press DL 80# 2 10    Cup walking  2 laps  Cueing for heel/toe pattern                 Neuromuscular Re-ed (78726)       Added 5/11   biodex balance              Manual Intervention (56849)              Tib/fib, talocrural Mobs  4'     Ankle mobs/PROM  6'                                     Modalities: vaso to L ankle 15' mod pressure    Pt. Education:  -pt educated on diagnosis, prognosis and expectations for rehab  -all pt questions were answered  -5/19 pt. To wean from crutch in the home, continue to use outside of the home    Home Exercise Program:  Access Code: [email or web address]  URL: [email or web address]  Date: 05/06/2022  Prepared by: Martina Hernandez    Exercises  Seated Ankle Alphabet - 2 x daily - 7 x weekly - 2 reps  Long Sitting Calf Stretch with Strap - 2 x daily - 7 x weekly - 3 reps - 20s hold  Seated Soleus Towel Stretch - 2 x daily - 7 x weekly - 3 sets - 10 reps  Seated Heel Toe Raises - 2 x daily - 7 x weekly - 2 sets - 10 reps  Towel Scrunches - 2 x daily - 7 x weekly - 2 sets - 10 reps  Ankle Inversion Eversion Towel Slide - 2 x daily - 7 x weekly - 2 sets - 10 reps  Straight Leg Raise - 2 x daily - 7 x weekly - 2 sets - 10 reps      Therapeutic Exercise and NMR EXR  [x] (71123) Provided verbal/tactile cueing for activities related to strengthening, flexibility, endurance, ROM for improvements in LE, proximal hip, and core control with self care, mobility, lifting, ambulation.  [] (82250) Provided verbal/tactile cueing for activities related to improving balance, coordination, kinesthetic sense, posture, motor skill, proprioception  to assist with LE, proximal hip, and core control in self care, mobility, lifting, ambulation and eccentric single leg control.   [] (61154) Therapist is in constant attendance of 2 or more patients providing skilled therapy interventions, but not providing any significant amount of measurable one-on-one time to either patient, for improvements in LE, proximal hip, and core control in self care, mobility, lifting, ambulation and eccentric single leg control.      NMR and Therapeutic Activities:    [x] (71911 or 01227) Provided verbal/tactile cueing for activities related to improving balance, coordination, kinesthetic sense, posture, motor skill, proprioception and motor activation to allow for proper function of core, proximal hip and LE with self care and ADLs  [] (17521) Gait Re-education- Provided training and instruction to the patient for proper LE, core and proximal hip recruitment and positioning and eccentric body weight control with ambulation re-education including up and down stairs     Home Exercise Program:    [x] (06597) Reviewed/Progressed HEP activities related to strengthening, flexibility, endurance, ROM of core, proximal hip and LE for functional self-care, mobility, lifting and ambulation/stair navigation   [] (94461)Reviewed/Progressed HEP activities related to improving balance, coordination, kinesthetic sense, posture, motor skill, proprioception of core, proximal hip and LE for self care, mobility, lifting, and ambulation/stair navigation      Manual Treatments:  PROM / STM / Oscillations-Mobs:  G-I, II, III, IV (PA's, Inf., Post.)  [x] (77897) Provided manual therapy to mobilize LE, proximal hip and/or LS spine soft tissue/joints for the purpose of modulating pain, promoting relaxation,  increasing ROM, reducing/eliminating soft tissue swelling/inflammation/restriction, improving soft tissue extensibility and allowing for proper ROM for normal function with self care, mobility, lifting and ambulation. Modalities:  [x] (89086) Vasopneumatic compression: Utilized vasopneumatic compression to decrease edema / swelling for the purpose of improving mobility and quad tone / recruitment which will allow for increased overall function including but not limited to self-care, transfers, ambulation, and ascending / descending stairs. Charges:  Timed Code Treatment Minutes: 40   Total Treatment Minutes: 62     [] EVAL - LOW (95503)   [] EVAL - MOD (91379)  [] EVAL - HIGH (65254)  [] RE-EVAL (96467)  [x] VY(00246) x 1      [] Ionto   [] NMR (25799) x       [x] Vaso  [x] Manual (47480) x 1      [] Ultrasound  [x] TA x  1      [] Mech Traction (21600)  [] Aquatic Therapy x      [] ES (un) (56288):   [] Home Management Training x  [] ES(attended) (04842)   [] Dry Needling 1-2 muscles (95930):  [] Dry Needling 3+ muscles (215730  [] Group:      [] Other:     GOALS:   Patient stated goal: walk normal  [] Progressing: [] Met: [] Not Met: [] Adjusted    Therapist goals for Patient:   Short Term Goals: To be achieved in: 2 weeks  1. Independent in HEP and progression per patient tolerance, in order to prevent re-injury. [] Progressing: [] Met: [] Not Met: [] Adjusted  2. Patient will have a decrease in pain <3/10 to facilitate improvement in movement, function, and ADLs as indicated by Functional Deficits. [] Progressing: [] Met: [] Not Met: [] Adjusted    Long Term Goals: To be achieved in: 10-12 weeks  1. FOTO score of at least 59 to assist with reaching prior level of function. [] Progressing: [] Met: [] Not Met: [] Adjusted  2. Patient will demonstrate increased AROM to at least L ankle DF 5, PF 40 to allow for proper joint functioning as indicated by patients Functional Deficits. [] Progressing: [] Met: [] Not Met: [] Adjusted  3.  Patient will demonstrate an increase in Strength to at least 4+/5 throughout as well as good proximal hip strength and control to allow for proper functional mobility as indicated by patients Functional Deficits. [] Progressing: [] Met: [] Not Met: [] Adjusted  4. Patient will return to functional activities including walking on even ground with minimal deviations and no AD without increased symptoms or restriction. [] Progressing: [] Met: [] Not Met: [] Adjusted  5. Patient will return to reciprocal stairs without increased symptoms. [] Progressing: [] Met: [] Not Met: [] Adjusted     Overall Progression Towards Functional goals/ Treatment Progress Update:  [] Patient is progressing as expected towards functional goals listed. [] Progression is slowed due to complexities/Impairments listed. [] Progression has been slowed due to co-morbidities. [x] Plan just implemented, too soon to assess goals progression <30days   [] Goals require adjustment due to lack of progress  [] Patient is not progressing as expected and requires additional follow up with physician  [] Other    Persisting Functional Limitations/Impairments:  []Sitting [x]Standing   [x]Walking [x]Stairs   [x]Transfers [x]ADLs   [x]Squatting/bending []Kneeling  [x]Housework []Job related tasks  [x]Driving [x]Sports/Recreation   [x]Sleeping []Other:    ASSESSMENT:   Pt. Tolerated therapy today without complaints. Significant improvements in ankle mobility. Pt. Continues to have some difficulty with full range heel raise while standing, unable to perform SL. Improving gait throughout clinic without AD, pt. To begin to wean from crutch at home. Cup walking performed with cueing throughout for heel/toe pattern and quad activation. Added leg press for improved functional LE strength to improve gait and transitional movements. Difficulty controlling eccentric movement with ankle PREs.  Occasional UE support required with biodex balance due to deficits in proprioception. Pt. Will continue to benefit from skilled therapy in order to restore functional strength and mobility for normalized gait. Treatment/Activity Tolerance:  [x] Pt able to complete treatment [] Patient limited by fatique  [] Patient limited by pain  [] Patient limited by other medical complications  [] Other:     Prognosis:  [x] Good [] Fair  [] Poor    Patient Requires Follow-up: [x] Yes  [] No    Return to Play:    [x]  N/A   []  Stage 1: Intro to Strength   []  Stage 2: Return to Run and Strength   []  Stage 3: Return to Jump and Strength   []  Stage 4: Dynamic Strength and Agility   []  Stage 5: Sport Specific Training   []  Ready to Return to Play, Meets All Above Stages   []  Not Ready for Return to Sports   Comments:            PLAN: See eval. PT 1-2x / week for 10-12 weeks. [x] Continue per plan of care [] Alter current plan (see comments)  [] Plan of care initiated [] Hold pending MD visit [] Discharge    Electronically signed by: Bladimir Farmer PT, PTA [id number]      Note: If patient does not return for scheduled/ recommended follow up visits, this note will serve as a discharge from care along with most recent update on progress.

## 2022-05-24 ENCOUNTER — HOSPITAL ENCOUNTER (OUTPATIENT)
Dept: PHYSICAL THERAPY | Age: 32
Setting detail: THERAPIES SERIES
Discharge: HOME | End: 2022-05-24
Payer: COMMERCIAL

## 2022-05-24 PROCEDURE — 97140 MANUAL THERAPY 1/> REGIONS: CPT

## 2022-05-24 PROCEDURE — 97530 THERAPEUTIC ACTIVITIES: CPT

## 2022-05-24 PROCEDURE — 97110 THERAPEUTIC EXERCISES: CPT

## 2022-05-24 PROCEDURE — 97016 VASOPNEUMATIC DEVICE THERAPY: CPT

## 2022-05-24 NOTE — FLOWSHEET NOTE
[address] [place] [address]  Phone: [phone]   Fax: [phone]    Physical Therapy Treatment Note/ Progress Report:     Date:  2022    Patient Name:  Jay High    :  1990  MRN: [id number]  Restrictions/Precautions:    Medical/Treatment Diagnosis Information:  Diagnosis: S82.842A (ICD-10-CM) - Bimalleolar fracture, left, closed, initial encounter; 769 5523 (ICD-10-CM) - Syndesmotic disruption of ankle, initial encounter  Treatment Diagnosis: L ankle pain, decreased mobility, decreased LE strength limiting gait  Insurance/Certification information:  PT Insurance Information: [facility] Choice Plus  Physician Information:  Nils Osgood, APRN - CNP   Plan of care signed (Y/N): []  Yes [x]  No     Date of Patient follow up with Physician:       Progress Report: []  Yes  [x]  No     Date Range for reporting period:  Beginnin22  Ending:     Progress report due (10 Rx/or 30 days whichever is less): visit #10 or 24 (date)     Recertification due (POC duration/ or 90 days whichever is less): visit #24 or 22 (date)     Visit # Insurance Allowable Auth required? Date Range   6 30 []  Yes  [x]  No n/a       Latex Allergy:  [x]NO      []YES  Preferred Language for Healthcare:   [x]English       []other:    Functional Scale:           Date assessed:  FOTO physical FS primary measure score = 26; risk adjusted = 49  22    Pain level:  7/10  Ankle    8/10 w/ sharp pain     SUBJECTIVE:  States doing a little more walking/standing over weekend but yesterday at home, pt reports having significant medial ankle pain described at sharp and stabbing, only with standing to wash dishes and shifting weight on/off of foot. Pain happened when shifting weight back onto L LE from leaning toward R side.   Pt denies sharp pain with resting yesterday     OBJECTIVE:     Girth (cm) L - pre vaso / post vaso R - pre-vaso / post-vaso   Figure 8 54.4 53.0 RESTRICTIONS/PRECAUTIONS: 11/22/21 L ankle ORIF, 4/7/22 hardware removal    No WB or ROM restrictions at this time    Exercises/Interventions:     Therapeutic Exercise (92734)  Resistance / level Sets/sec Reps Notes / Cues   Recumbent bike  5'     Ankle alphabet  2     IB gastroc stretch  20\" 3    IB soleus stretch  20\" 3    Standing HR/TR  1 20 ^5/17     1 20    Long sit EOB hamstring stretch  30\" 3 Added 5/11   SLR - flexion 2# 2 10 Cueing for quad activation   sidelying hip abduction 2# 2 10 Added 5/11   Supine bridges  1 15 5/17 - cueing for glute activation and push through LE's. Ankle inversion/eversion blue 2 10                                Therapeutic Activities (57788)         5'        Forward step ups 4\" step 2 10 5/17   Leg press DL 80# 2 10    Cup walking  2 laps  Cueing for heel/toe pattern                 Neuromuscular Re-ed (83909)       Added 5/11   biodex balance              Manual Intervention (14539)              Tib/fib, talocrural Mobs  4'     Ankle mobs/PROM  6'                                     Modalities: vaso to L ankle 15' mod pressure    Pt. Education:  -pt educated on diagnosis, prognosis and expectations for rehab  -all pt questions were answered  -5/19 pt. To wean from crutch in the home, continue to use outside of the home    Home Exercise Program:  Access Code: [email or web address]  URL: [email or web address]  Date: 05/06/2022  Prepared by: Florinda Kehr    Exercises  Seated Ankle Alphabet - 2 x daily - 7 x weekly - 2 reps  Long Sitting Calf Stretch with Strap - 2 x daily - 7 x weekly - 3 reps - 20s hold  Seated Soleus Towel Stretch - 2 x daily - 7 x weekly - 3 sets - 10 reps  Seated Heel Toe Raises - 2 x daily - 7 x weekly - 2 sets - 10 reps  Towel Scrunches - 2 x daily - 7 x weekly - 2 sets - 10 reps  Ankle Inversion Eversion Towel Slide - 2 x daily - 7 x weekly - 2 sets - 10 reps  Straight Leg Raise - 2 x daily - 7 x weekly - 2 sets - 10 reps      Therapeutic Exercise and NMR EXR  [x] (38661) Provided verbal/tactile cueing for activities related to strengthening, flexibility, endurance, ROM for improvements in LE, proximal hip, and core control with self care, mobility, lifting, ambulation.  [] (28571) Provided verbal/tactile cueing for activities related to improving balance, coordination, kinesthetic sense, posture, motor skill, proprioception  to assist with LE, proximal hip, and core control in self care, mobility, lifting, ambulation and eccentric single leg control.   [] (82216) Therapist is in constant attendance of 2 or more patients providing skilled therapy interventions, but not providing any significant amount of measurable one-on-one time to either patient, for improvements in LE, proximal hip, and core control in self care, mobility, lifting, ambulation and eccentric single leg control.      NMR and Therapeutic Activities:    [x] (59401 or 74977) Provided verbal/tactile cueing for activities related to improving balance, coordination, kinesthetic sense, posture, motor skill, proprioception and motor activation to allow for proper function of core, proximal hip and LE with self care and ADLs  [] (03035) Gait Re-education- Provided training and instruction to the patient for proper LE, core and proximal hip recruitment and positioning and eccentric body weight control with ambulation re-education including up and down stairs     Home Exercise Program:    [x] (51342) Reviewed/Progressed HEP activities related to strengthening, flexibility, endurance, ROM of core, proximal hip and LE for functional self-care, mobility, lifting and ambulation/stair navigation   [] (35783)Reviewed/Progressed HEP activities related to improving balance, coordination, kinesthetic sense, posture, motor skill, proprioception of core, proximal hip and LE for self care, mobility, lifting, and ambulation/stair navigation      Manual Treatments:  PROM / STM / Oscillations-Mobs:  G-I, II, III, IV (PA's, Inf., Post.)  [x] (96077) Provided manual therapy to mobilize LE, proximal hip and/or LS spine soft tissue/joints for the purpose of modulating pain, promoting relaxation,  increasing ROM, reducing/eliminating soft tissue swelling/inflammation/restriction, improving soft tissue extensibility and allowing for proper ROM for normal function with self care, mobility, lifting and ambulation. Modalities:  [x] (86363) Vasopneumatic compression: Utilized vasopneumatic compression to decrease edema / swelling for the purpose of improving mobility and quad tone / recruitment which will allow for increased overall function including but not limited to self-care, transfers, ambulation, and ascending / descending stairs. Charges:  Timed Code Treatment Minutes: 40   Total Treatment Minutes: 62     [] EVAL - LOW (87291)   [] EVAL - MOD (93699)  [] EVAL - HIGH (27035)  [] RE-EVAL (06030)  [x] BF(38231) x 1      [] Ionto   [] NMR (93874) x       [x] Vaso  [x] Manual (32085) x 1      [] Ultrasound  [x] TA x  1      [] Mech Traction (37037)  [] Aquatic Therapy x      [] ES (un) (17544):   [] Home Management Training x  [] ES(attended) (69458)   [] Dry Needling 1-2 muscles (87227):  [] Dry Needling 3+ muscles (283135)  [] Group:      [] Other:     GOALS:   Patient stated goal: walk normal  [] Progressing: [] Met: [] Not Met: [] Adjusted    Therapist goals for Patient:   Short Term Goals: To be achieved in: 2 weeks  1. Independent in HEP and progression per patient tolerance, in order to prevent re-injury. [] Progressing: [] Met: [] Not Met: [] Adjusted  2. Patient will have a decrease in pain <3/10 to facilitate improvement in movement, function, and ADLs as indicated by Functional Deficits. [] Progressing: [] Met: [] Not Met: [] Adjusted    Long Term Goals: To be achieved in: 10-12 weeks  1. FOTO score of at least 59 to assist with reaching prior level of function.   [] Progressing: [] Met: [] Not Met: [] Adjusted  2. Patient will demonstrate increased AROM to at least L ankle DF 5, PF 40 to allow for proper joint functioning as indicated by patients Functional Deficits. [] Progressing: [] Met: [] Not Met: [] Adjusted  3. Patient will demonstrate an increase in Strength to at least 4+/5 throughout as well as good proximal hip strength and control to allow for proper functional mobility as indicated by patients Functional Deficits. [] Progressing: [] Met: [] Not Met: [] Adjusted  4. Patient will return to functional activities including walking on even ground with minimal deviations and no AD without increased symptoms or restriction. [] Progressing: [] Met: [] Not Met: [] Adjusted  5. Patient will return to reciprocal stairs without increased symptoms. [] Progressing: [] Met: [] Not Met: [] Adjusted     Overall Progression Towards Functional goals/ Treatment Progress Update:  [] Patient is progressing as expected towards functional goals listed. [] Progression is slowed due to complexities/Impairments listed. [] Progression has been slowed due to co-morbidities. [x] Plan just implemented, too soon to assess goals progression <30days   [] Goals require adjustment due to lack of progress  [] Patient is not progressing as expected and requires additional follow up with physician  [] Other    Persisting Functional Limitations/Impairments:  []Sitting [x]Standing   [x]Walking [x]Stairs   [x]Transfers [x]ADLs   [x]Squatting/bending []Kneeling  [x]Housework []Job related tasks  [x]Driving [x]Sports/Recreation   [x]Sleeping []Other:    ASSESSMENT:  Noted increased tenderness with STM along inferior medial malleolus this date as well as increased ankle joint tightness with manual mobilizations. Pt continues to struggle with ankle weakness and limited strength and mobility.   Continue with skilled manual therapy to improve ankle motion as well as strengthening exercises to improve ankle stability and tolerance to weight shifting and standing/walking. Treatment/Activity Tolerance:  [x] Pt able to complete treatment [] Patient limited by fatique  [] Patient limited by pain  [] Patient limited by other medical complications  [] Other:     Prognosis:  [x] Good [] Fair  [] Poor    Patient Requires Follow-up: [x] Yes  [] No    Return to Play:    [x]  N/A   []  Stage 1: Intro to Strength   []  Stage 2: Return to Run and Strength   []  Stage 3: Return to Jump and Strength   []  Stage 4: Dynamic Strength and Agility   []  Stage 5: Sport Specific Training   []  Ready to Return to Play, Meets All Above Stages   []  Not Ready for Return to Sports   Comments:            PLAN: See eval. PT 1-2x / week for 10-12 weeks. [x] Continue per plan of care [] Alter current plan (see comments)  [] Plan of care initiated [] Hold pending MD visit [] Discharge    Electronically signed by: Carlo Lima, PTA [id number]      Note: If patient does not return for scheduled/ recommended follow up visits, this note will serve as a discharge from care along with most recent update on progress.

## 2022-05-27 ENCOUNTER — HOSPITAL ENCOUNTER (OUTPATIENT)
Dept: PHYSICAL THERAPY | Age: 32
Setting detail: THERAPIES SERIES
Discharge: HOME | End: 2022-05-27
Payer: COMMERCIAL

## 2022-05-27 PROCEDURE — 97530 THERAPEUTIC ACTIVITIES: CPT | Performed by: PHYSICAL THERAPIST

## 2022-05-27 PROCEDURE — 97016 VASOPNEUMATIC DEVICE THERAPY: CPT | Performed by: PHYSICAL THERAPIST

## 2022-05-27 PROCEDURE — 97110 THERAPEUTIC EXERCISES: CPT | Performed by: PHYSICAL THERAPIST

## 2022-05-27 PROCEDURE — 97140 MANUAL THERAPY 1/> REGIONS: CPT | Performed by: PHYSICAL THERAPIST

## 2022-05-27 NOTE — FLOWSHEET NOTE
[address] [place] [address]  Phone: [phone]   Fax: [phone]    Physical Therapy Treatment Note/ Progress Report:     Date:  2022    Patient Name:  Silvestre Randall    :  1990  MRN: [id number]  Restrictions/Precautions:    Medical/Treatment Diagnosis Information:  Diagnosis: S82.842A (ICD-10-CM) - Bimalleolar fracture, left, closed, initial encounter; 769 5523 (ICD-10-CM) - Syndesmotic disruption of ankle, initial encounter  Treatment Diagnosis: L ankle pain, decreased mobility, decreased LE strength limiting gait  Insurance/Certification information:  PT Insurance Information: [facility] Choice Plus  Physician Information:  JUANPABLO Enriquez CNP   Plan of care signed (Y/N): []  Yes [x]  No     Date of Patient follow up with Physician:       Progress Report: []  Yes  [x]  No     Date Range for reporting period:  Beginnin22  Ending:     Progress report due (10 Rx/or 30 days whichever is less): visit #10 or 11 (date)     Recertification due (POC duration/ or 90 days whichever is less): visit #24 or 22 (date)     Visit # Insurance Allowable Auth required? Date Range   6 30 []  Yes  [x]  No n/a       Latex Allergy:  [x]NO      []YES  Preferred Language for Healthcare:   [x]English       []other:    Functional Scale:           Date assessed:  FOTO physical FS primary measure score = 26; risk adjusted = 49  22    Pain level:  5-10  Ankle       SUBJECTIVE:  Pt. States that her ankle is feeling a little better than last therapy session. Pain occurs with walking and with lateral movements.      OBJECTIVE:     Girth (cm) L - pre vaso / post vaso R - pre-vaso / post-vaso   Figure 8 55.0 53.0            RESTRICTIONS/PRECAUTIONS: 21 L ankle ORIF, 22 hardware removal    No WB or ROM restrictions at this time    Exercises/Interventions:     Therapeutic Exercise (20595)  Resistance / level Sets/sec Reps Notes / Cues       Ankle alphabet  2 IB gastroc stretch  20\" 3    IB soleus stretch  20\" 3    Standing HR/TR Half roll 1 20 ^5/17     1 20    Long sit EOB hamstring stretch  30\" 3 Added 5/11   SLR - flexion 2# 2 10 Cueing for quad activation   sidelying hip abduction 2# 2 10 Added 5/11   Supine bridges  1 15 5/17 - cueing for glute activation and push through LE's. Ankle inversion/eversion blue 2 10                                Therapeutic Activities (67529)         5'        Forward step ups 4\" step 2 10 5/17   Leg press DL 80# 2 10     Cueing for heel/toe pattern   Bungee walking yellow 3 ea 4 direction           Neuromuscular Re-ed (44250)       Added 5/11   biodex balance PS L10 2'  For improved proprioception and balance          Manual Intervention (77806)              Tib/fib, talocrural Mobs  3'     Ankle mobs/PROM  6'                                     Modalities: vaso to L ankle 15' mod pressure    Pt. Education:  -pt educated on diagnosis, prognosis and expectations for rehab  -all pt questions were answered  -5/19 pt. To wean from crutch in the home, continue to use outside of the home    Home Exercise Program:  Access Code: [email or web address]  URL: [email or web address]  Date: 05/06/2022  Prepared by: Brayan Sanchez    Exercises  Seated Ankle Alphabet - 2 x daily - 7 x weekly - 2 reps  Long Sitting Calf Stretch with Strap - 2 x daily - 7 x weekly - 3 reps - 20s hold  Seated Soleus Towel Stretch - 2 x daily - 7 x weekly - 3 sets - 10 reps  Seated Heel Toe Raises - 2 x daily - 7 x weekly - 2 sets - 10 reps  Towel Scrunches - 2 x daily - 7 x weekly - 2 sets - 10 reps  Ankle Inversion Eversion Towel Slide - 2 x daily - 7 x weekly - 2 sets - 10 reps  Straight Leg Raise - 2 x daily - 7 x weekly - 2 sets - 10 reps      Therapeutic Exercise and NMR EXR  [x] (37006) Provided verbal/tactile cueing for activities related to strengthening, flexibility, endurance, ROM for improvements in LE, proximal hip, and core control with self care, mobility, lifting, ambulation.  [] (95984) Provided verbal/tactile cueing for activities related to improving balance, coordination, kinesthetic sense, posture, motor skill, proprioception  to assist with LE, proximal hip, and core control in self care, mobility, lifting, ambulation and eccentric single leg control.   [] (55993) Therapist is in constant attendance of 2 or more patients providing skilled therapy interventions, but not providing any significant amount of measurable one-on-one time to either patient, for improvements in LE, proximal hip, and core control in self care, mobility, lifting, ambulation and eccentric single leg control.      NMR and Therapeutic Activities:    [x] (21371 or 50259) Provided verbal/tactile cueing for activities related to improving balance, coordination, kinesthetic sense, posture, motor skill, proprioception and motor activation to allow for proper function of core, proximal hip and LE with self care and ADLs  [] (82319) Gait Re-education- Provided training and instruction to the patient for proper LE, core and proximal hip recruitment and positioning and eccentric body weight control with ambulation re-education including up and down stairs     Home Exercise Program:    [x] (08285) Reviewed/Progressed HEP activities related to strengthening, flexibility, endurance, ROM of core, proximal hip and LE for functional self-care, mobility, lifting and ambulation/stair navigation   [] (77660)Reviewed/Progressed HEP activities related to improving balance, coordination, kinesthetic sense, posture, motor skill, proprioception of core, proximal hip and LE for self care, mobility, lifting, and ambulation/stair navigation      Manual Treatments:  PROM / STM / Oscillations-Mobs:  G-I, II, III, IV (PA's, Inf., Post.)  [x] (98859) Provided manual therapy to mobilize LE, proximal hip and/or LS spine soft tissue/joints for the purpose of modulating pain, promoting relaxation,  increasing ROM, reducing/eliminating soft tissue swelling/inflammation/restriction, improving soft tissue extensibility and allowing for proper ROM for normal function with self care, mobility, lifting and ambulation. Modalities:  [x] (93486) Vasopneumatic compression: Utilized vasopneumatic compression to decrease edema / swelling for the purpose of improving mobility and quad tone / recruitment which will allow for increased overall function including but not limited to self-care, transfers, ambulation, and ascending / descending stairs. Charges:  Timed Code Treatment Minutes: 42   Total Treatment Minutes: 60     [] EVAL - LOW (07769)   [] EVAL - MOD (71711)  [] EVAL - HIGH (15978)  [] RE-EVAL (93789)  [x] HD(70195) x 1      [] Ionto   [] NMR (28509) x       [x] Vaso  [x] Manual (14351) x 1      [] Ultrasound  [x] TA x  1      [] Mech Traction (46508)  [] Aquatic Therapy x      [] ES (un) (20211):   [] Home Management Training x  [] ES(attended) (30168)   [] Dry Needling 1-2 muscles (00201):  [] Dry Needling 3+ muscles (858946)  [] Group:      [] Other:     GOALS:   Patient stated goal: walk normal  [] Progressing: [] Met: [] Not Met: [] Adjusted    Therapist goals for Patient:   Short Term Goals: To be achieved in: 2 weeks  1. Independent in HEP and progression per patient tolerance, in order to prevent re-injury. [] Progressing: [] Met: [] Not Met: [] Adjusted  2. Patient will have a decrease in pain <3/10 to facilitate improvement in movement, function, and ADLs as indicated by Functional Deficits. [] Progressing: [] Met: [] Not Met: [] Adjusted    Long Term Goals: To be achieved in: 10-12 weeks  1. FOTO score of at least 59 to assist with reaching prior level of function. [] Progressing: [] Met: [] Not Met: [] Adjusted  2. Patient will demonstrate increased AROM to at least L ankle DF 5, PF 40 to allow for proper joint functioning as indicated by patients Functional Deficits.    [] Progressing: [] Met: [] Not Met: [] Adjusted  3. Patient will demonstrate an increase in Strength to at least 4+/5 throughout as well as good proximal hip strength and control to allow for proper functional mobility as indicated by patients Functional Deficits. [] Progressing: [] Met: [] Not Met: [] Adjusted  4. Patient will return to functional activities including walking on even ground with minimal deviations and no AD without increased symptoms or restriction. [] Progressing: [] Met: [] Not Met: [] Adjusted  5. Patient will return to reciprocal stairs without increased symptoms. [] Progressing: [] Met: [] Not Met: [] Adjusted     Overall Progression Towards Functional goals/ Treatment Progress Update:  [] Patient is progressing as expected towards functional goals listed. [] Progression is slowed due to complexities/Impairments listed. [] Progression has been slowed due to co-morbidities. [x] Plan just implemented, too soon to assess goals progression <30days   [] Goals require adjustment due to lack of progress  [] Patient is not progressing as expected and requires additional follow up with physician  [] Other    Persisting Functional Limitations/Impairments:  []Sitting [x]Standing   [x]Walking [x]Stairs   [x]Transfers [x]ADLs   [x]Squatting/bending []Kneeling  [x]Housework []Job related tasks  [x]Driving [x]Sports/Recreation   [x]Sleeping []Other:    ASSESSMENT:  Pt. Tolerated therapy today without complaints. Pt. Continues to present with strength deficits throughout L LE limiting functional movement and gait. Pt. Katheen Line by addition of biodex balance and required frequent UE support due to persisting deficits in proprioception. Improving control with ankle PREs. Cueing required throughout with step ups for improved quality of movement. Pt. Will continue to benefit from skilled therapy in order to restore functional strength for normalized gait.      Treatment/Activity Tolerance:  [x] Pt able to complete treatment [] Patient limited by fatique  [] Patient limited by pain  [] Patient limited by other medical complications  [] Other:     Prognosis:  [x] Good [] Fair  [] Poor    Patient Requires Follow-up: [x] Yes  [] No    Return to Play:    [x]  N/A   []  Stage 1: Intro to Strength   []  Stage 2: Return to Run and Strength   []  Stage 3: Return to Jump and Strength   []  Stage 4: Dynamic Strength and Agility   []  Stage 5: Sport Specific Training   []  Ready to Return to Play, Meets All Above Stages   []  Not Ready for Return to Sports   Comments:            PLAN: See eval. PT 1-2x / week for 10-12 weeks. [x] Continue per plan of care [] Alter current plan (see comments)  [] Plan of care initiated [] Hold pending MD visit [] Discharge    Electronically signed by: Lion Vo PT, DPT      Note: If patient does not return for scheduled/ recommended follow up visits, this note will serve as a discharge from care along with most recent update on progress.

## 2022-05-31 ENCOUNTER — HOSPITAL ENCOUNTER (OUTPATIENT)
Dept: PHYSICAL THERAPY | Age: 32
Setting detail: THERAPIES SERIES
End: 2022-05-31
Payer: COMMERCIAL

## 2022-06-03 ENCOUNTER — APPOINTMENT (OUTPATIENT)
Dept: PHYSICAL THERAPY | Age: 32
End: 2022-06-03
Payer: COMMERCIAL

## 2022-06-08 ENCOUNTER — HOSPITAL ENCOUNTER (OUTPATIENT)
Dept: PHYSICAL THERAPY | Age: 32
Setting detail: THERAPIES SERIES
Discharge: HOME | End: 2022-06-08
Payer: COMMERCIAL

## 2022-06-08 PROCEDURE — 97530 THERAPEUTIC ACTIVITIES: CPT

## 2022-06-08 PROCEDURE — 97110 THERAPEUTIC EXERCISES: CPT

## 2022-06-08 PROCEDURE — 97140 MANUAL THERAPY 1/> REGIONS: CPT

## 2022-06-08 NOTE — PROGRESS NOTES
[address] [place] [address]  Phone: [phone]   Fax: [phone]    Physical Therapy Treatment Note/ Progress Report:     Date:  2022    Patient Name:  Darrell Bojorquez    :  1990  MRN: [id number]  Restrictions/Precautions:    Medical/Treatment Diagnosis Information:  Diagnosis: S82.842A (ICD-10-CM) - Bimalleolar fracture, left, closed, initial encounter; 769 5523 (ICD-10-CM) - Syndesmotic disruption of ankle, initial encounter  Treatment Diagnosis: L ankle pain, decreased mobility, decreased LE strength limiting gait  Insurance/Certification information:  PT Insurance Information: [facility] Choice Plus  Physician Information:  Floydene Sessions, APRN - CNP   Plan of care signed (Y/N): []  Yes [x]  No     Date of Patient follow up with Physician: 22      Progress Report: [x]  Yes  []  No     Date Range for reporting period:  Beginnin22  PN: 22  Ending:     Progress report due (10 Rx/or 30 days whichever is less): visit #17 or 76 (date)     Recertification due (POC duration/ or 90 days whichever is less): visit #24 or 22 (date)     Visit # Insurance Allowable Auth required? Date Range    30 []  Yes  [x]  No n/a       Latex Allergy:  [x]NO      []YES  Preferred Language for Healthcare:   [x]English       []other:    Functional Scale:           Date assessed:  FOTO physical FS primary measure score = 26; risk adjusted = 49  22  FOTO physical FS primary measure score = 44     22    Pain level:  5/10  Ankle       SUBJECTIVE:  Pt reports overall she is doing much better. It has felt better since hardware removal. She continues to wake up with stiffness/soreness and has some swelling by the end of the day. She continues to feel limited in DF and Inv. She continues to be most limited in driving and stairs (currently using step to pattern). Her laundry is in the basement at home. Her back pain has improved because her limp has improved.  Pt has recently found out that her mom has cancer. She will be leaving for [employer] for a month. OBJECTIVE: 5/27    Girth (cm) L - pre vaso / post vaso R - pre-vaso / post-vaso   Figure 8 55.0 53.0          6/8:  Gait Analysis: no heel strike L, decreased push off on L, decreased L stance time      PROM AROM     L R L R   Knee Flexion     [facility] WF   Knee Extension     WFL WFL   Dorsiflexion      2 deg 5   Plantarflexion      30 deg 40   Inversion      32 30   Eversion      42 26     Strength (0-5) / Myotomes Left Right   Hip Flexion - seated (L1-2) 4 4+   Hip Abduction 4 4   Quads (L2-4) 4 5   Hamstrings 4 4+   Ankle Dorsiflexion (L4-5) 4* 4+   Ankle Plantarflexion (S1-2) 4 4+   Ankle Inversion 3+ 4+   Ankle Eversion (S1-2) 4- 4+   Great Toe Extension (L5)               Girth (cm)       Figure 8       Balance:  Tandem: steadier R>L; use of ankle strategy but increased recruitment of hip strategy on L  SLS: L: unable, R: >15sec w/ ankle strategy    RESTRICTIONS/PRECAUTIONS: 11/22/21 L ankle ORIF, 4/7/22 hardware removal    No WB or ROM restrictions at this time    Exercises/Interventions:     Therapeutic Exercise (50256)  Resistance / level Sets/sec Reps Notes / Cues   Recumbent bike  5'     Ankle alphabet       IB gastroc stretch  20\" 3    IB soleus stretch  20\" 3    Standing HR/TR Half roll 1 20 ^5/17     1 20    Long sit EOB hamstring stretch  30\" 3 Added 5/11   SLR - flexion 2# Cueing for quad activation   sidelying hip abduction 2# Added 5/11   Supine bridges  5/17 - cueing for glute activation and push through LE's.     Ankle inversion/eversion lime 2 10 Poor eccentric control with blue TB                               Therapeutic Activities (24087)         5'        Forward step ups 4\" step 5/17   Leg press DL 80#     Cueing for heel/toe pattern   Bungee walking yellow    Reassessment of objective measures, updated prognosis, provided pt with compression stocking and pt ed regarding use for traveling  10 min  6/8 NPV Update HEP before pt leaves for [place]         Neuromuscular Re-ed (34992)       Added 5/11   biodex balance PS L10   For improved proprioception and balance          Manual Intervention (08371)              Tib/fib, talocrural Mobs  3'     Ankle mobs/PROM  6'                                     Modalities:     Pt. Education:  -pt educated on diagnosis, prognosis and expectations for rehab  -all pt questions were answered  -5/19 pt. To wean from crutch in the home, continue to use outside of the home    Home Exercise Program:  Access Code: [email or web address]  URL: [email or web address]  Date: 05/06/2022  Prepared by: Mylene Self    Exercises  Seated Ankle Alphabet - 2 x daily - 7 x weekly - 2 reps  Long Sitting Calf Stretch with Strap - 2 x daily - 7 x weekly - 3 reps - 20s hold  Seated Soleus Towel Stretch - 2 x daily - 7 x weekly - 3 sets - 10 reps  Seated Heel Toe Raises - 2 x daily - 7 x weekly - 2 sets - 10 reps  Towel Scrunches - 2 x daily - 7 x weekly - 2 sets - 10 reps  Ankle Inversion Eversion Towel Slide - 2 x daily - 7 x weekly - 2 sets - 10 reps  Straight Leg Raise - 2 x daily - 7 x weekly - 2 sets - 10 reps    NPV:  4 way ankle TB orange or green  Standing gastroc stretch  Standing soleus stretch  Tandem balance  Tibial ant glide  Standing heel raises    Therapeutic Exercise and NMR EXR  [x] (41083) Provided verbal/tactile cueing for activities related to strengthening, flexibility, endurance, ROM for improvements in LE, proximal hip, and core control with self care, mobility, lifting, ambulation.  [] (20055) Provided verbal/tactile cueing for activities related to improving balance, coordination, kinesthetic sense, posture, motor skill, proprioception  to assist with LE, proximal hip, and core control in self care, mobility, lifting, ambulation and eccentric single leg control.   [] (34048) Therapist is in constant attendance of 2 or more patients providing skilled therapy interventions, but not providing any significant amount of measurable one-on-one time to either patient, for improvements in LE, proximal hip, and core control in self care, mobility, lifting, ambulation and eccentric single leg control. NMR and Therapeutic Activities:    [x] (32598 or 57091) Provided verbal/tactile cueing for activities related to improving balance, coordination, kinesthetic sense, posture, motor skill, proprioception and motor activation to allow for proper function of core, proximal hip and LE with self care and ADLs  [] (20905) Gait Re-education- Provided training and instruction to the patient for proper LE, core and proximal hip recruitment and positioning and eccentric body weight control with ambulation re-education including up and down stairs     Home Exercise Program:    [x] (54326) Reviewed/Progressed HEP activities related to strengthening, flexibility, endurance, ROM of core, proximal hip and LE for functional self-care, mobility, lifting and ambulation/stair navigation   [] (39603)Reviewed/Progressed HEP activities related to improving balance, coordination, kinesthetic sense, posture, motor skill, proprioception of core, proximal hip and LE for self care, mobility, lifting, and ambulation/stair navigation      Manual Treatments:  PROM / STM / Oscillations-Mobs:  G-I, II, III, IV (PA's, Inf., Post.)  [x] (43377) Provided manual therapy to mobilize LE, proximal hip and/or LS spine soft tissue/joints for the purpose of modulating pain, promoting relaxation,  increasing ROM, reducing/eliminating soft tissue swelling/inflammation/restriction, improving soft tissue extensibility and allowing for proper ROM for normal function with self care, mobility, lifting and ambulation.      Modalities:  [x] (37941) Vasopneumatic compression: Utilized vasopneumatic compression to decrease edema / swelling for the purpose of improving mobility and quad tone / recruitment which will allow for increased overall function including but not limited to self-care, transfers, ambulation, and ascending / descending stairs. Charges:  Timed Code Treatment Minutes: 50   Total Treatment Minutes: 50     [] EVAL - LOW (19818)   [] EVAL - MOD (80058)  [] EVAL - HIGH (65809)  [] RE-EVAL (68028)  [x] VB(11030) x 1      [] Ionto   [] NMR (71904) x       [] Vaso  [x] Manual (04090) x 1      [] Ultrasound  [x] TA x  1      [] Mech Traction (68667)  [] Aquatic Therapy x      [] ES (un) (20400):   [] Home Management Training x  [] ES(attended) (91482)   [] Dry Needling 1-2 muscles (53992):  [] Dry Needling 3+ muscles (602418)  [] Group:      [] Other:     GOALS:   Patient stated goal: walk normal  [] Progressing: [] Met: [] Not Met: [] Adjusted    Therapist goals for Patient:   Short Term Goals: To be achieved in: 2 weeks  1. Independent in HEP and progression per patient tolerance, in order to prevent re-injury. [x] Progressing: [] Met: [] Not Met: [] Adjusted  2. Patient will have a decrease in pain <3/10 to facilitate improvement in movement, function, and ADLs as indicated by Functional Deficits. [x] Progressing: [] Met: [] Not Met: [] Adjusted    Long Term Goals: To be achieved in: 10-12 weeks  1. FOTO score of at least 59 to assist with reaching prior level of function. [x] Progressing: [] Met: [] Not Met: [] Adjusted  2. Patient will demonstrate increased AROM to at least L ankle DF 5, PF 40 to allow for proper joint functioning as indicated by patients Functional Deficits. [x] Progressing: [] Met: [] Not Met: [] Adjusted  3. Patient will demonstrate an increase in Strength to at least 4+/5 throughout as well as good proximal hip strength and control to allow for proper functional mobility as indicated by patients Functional Deficits. [x] Progressing: [] Met: [] Not Met: [] Adjusted  4.  Patient will return to functional activities including walking on even ground with minimal deviations and no AD without increased symptoms or restriction. [x] Progressing: [] Met: [] Not Met: [] Adjusted  5. Patient will return to reciprocal stairs without increased symptoms. [x] Progressing: [] Met: [] Not Met: [] Adjusted     Overall Progression Towards Functional goals/ Treatment Progress Update:  [] Patient is progressing as expected towards functional goals listed. [] Progression is slowed due to complexities/Impairments listed. [] Progression has been slowed due to co-morbidities. [x] Plan just implemented, too soon to assess goals progression <30days   [] Goals require adjustment due to lack of progress  [] Patient is not progressing as expected and requires additional follow up with physician  [] Other    Persisting Functional Limitations/Impairments:  []Sitting [x]Standing   [x]Walking [x]Stairs   [x]Transfers [x]ADLs   [x]Squatting/bending []Kneeling  [x]Housework []Job related tasks  [x]Driving [x]Sports/Recreation   [x]Sleeping []Other:    ASSESSMENT: Pt is a 27 yo female referred to PT s/p hardware removal occurring 4/7/22 following L ankle ORIF 11/22/21. She has been seen for 7 visits in PT. She presents with improved gross L ankle AROM, gross L ankle strength, balance, and gait pattern. She continues to present with deficits in DF and PF, gross strength (deficits are significant, eccentric control is poor), rearfoot jt mobility, gait pattern, and balance. Pt will be leaving the country to help care for her mother. She will need an updated HEP at [facility] prior to leaving on 6/12/22. She plans to return to PT when she returns from [facility]. She will benefit from skilled therapy to address these deficits, achieve goals, and progress to [facility].        Treatment/Activity Tolerance:  [x] Pt able to complete treatment [] Patient limited by fatique  [] Patient limited by pain  [] Patient limited by other medical complications  [] Other:     Prognosis:  [x] Good [] Fair  [] Poor    Patient Requires Follow-up: [x] Yes  [] No    Return to Play:    [x] N/A   []  Stage 1: Intro to Strength   []  Stage 2: Return to Run and Strength   []  Stage 3: Return to Jump and Strength   []  Stage 4: Dynamic Strength and Agility   []  Stage 5: Sport Specific Training   []  Ready to Return to Play, Meets All Above Stages   []  Not Ready for Return to Sports   Comments:            PLAN: See eval. PT 1-2x / week for 10-12 weeks. [x] Continue per plan of care [] Alter current plan (see comments)  [] Plan of care initiated [] Hold pending MD visit [] Discharge    Electronically signed by: Arnulfo Eng, PT, DPT      Note: If patient does not return for scheduled/ recommended follow up visits, this note will serve as a discharge from care along with most recent update on progress.

## 2022-06-10 ENCOUNTER — HOSPITAL ENCOUNTER (OUTPATIENT)
Dept: PHYSICAL THERAPY | Age: 32
Setting detail: THERAPIES SERIES
Discharge: HOME | End: 2022-06-10
Payer: COMMERCIAL

## 2022-06-10 PROCEDURE — 97110 THERAPEUTIC EXERCISES: CPT

## 2022-06-10 PROCEDURE — 97140 MANUAL THERAPY 1/> REGIONS: CPT

## 2022-06-10 PROCEDURE — 97530 THERAPEUTIC ACTIVITIES: CPT

## 2022-06-10 NOTE — FLOWSHEET NOTE
[address] [place] [address]  Phone: [phone]   Fax: [phone]    Physical Therapy Treatment Note/ Progress Report:     Date:  6/10/2022    Patient Name:  Silvestre Randall    :  1990  MRN: [id number]  Restrictions/Precautions:    Medical/Treatment Diagnosis Information:  Diagnosis: S82.842A (ICD-10-CM) - Bimalleolar fracture, left, closed, initial encounter; 769 [id number] (ICD-10-CM) - Syndesmotic disruption of ankle, initial encounter  Treatment Diagnosis: L ankle pain, decreased mobility, decreased LE strength limiting gait  Insurance/Certification information:  PT Insurance Information: [facility]  Physician Information:  JUANPABLO Enriquez - CNP   Plan of care signed (Y/N): [x]  Yes -  []  No     Date of Patient follow up with Physician: 22      Progress Report: []  Yes  [x]  No     Date Range for reporting period:  Beginnin22  PN: 22  Ending:     Progress report due (10 Rx/or 30 days whichever is less): visit #17 or 97 (date)     Recertification due (POC duration/ or 90 days whichever is less): visit #24 or 22 (date)     Visit # Insurance Allowable Auth required? Date Range   8 30 []  Yes  [x]  No n/a       Latex Allergy:  [x]NO      []YES  Preferred Language for Healthcare:   [x]English       []other:    Functional Scale:           Date assessed:  FOTO physical FS primary measure score = 26; risk adjusted = 49  22  FOTO physical FS primary measure score = 44     22    Pain level:  5/10  Ankle       SUBJECTIVE:  Pt reports that she feels good upon arrival. She states that she has some soreness in her calf and in her joint, but it is tolerable. She states that tolerated last session well.       OBJECTIVE:     Girth (cm) L - pre vaso / post vaso R - pre-vaso / post-vaso   Figure 8 55.0 53.0          :  Gait Analysis: no heel strike L, decreased push off on L, decreased L stance time      PROM AROM     L R L R   Knee Flexion     [facility] WF   Knee Extension     WFL WFL   Dorsiflexion      2 deg 5   Plantarflexion      30 deg 40   Inversion      32 30   Eversion      42 26     Strength (0-5) / Myotomes Left Right   Hip Flexion - seated (L1-2) 4 4+   Hip Abduction 4 4   Quads (L2-4) 4 5   Hamstrings 4 4+   Ankle Dorsiflexion (L4-5) 4* 4+   Ankle Plantarflexion (S1-2) 4 4+   Ankle Inversion 3+ 4+   Ankle Eversion (S1-2) 4- 4+   Great Toe Extension (L5)               Girth (cm)       Figure 8       Balance:  Tandem: steadier R>L; use of ankle strategy but increased recruitment of hip strategy on L  SLS: L: unable, R: >15sec w/ ankle strategy    RESTRICTIONS/PRECAUTIONS: 11/22/21 L ankle ORIF, 4/7/22 hardware removal    No WB or ROM restrictions at this time    Exercises/Interventions:     Therapeutic Exercise (74375)  Resistance / level Sets/sec Reps Notes / Cues   Recumbent bike  5'     Ankle alphabet       IB gastroc stretch  30\" 2    IB soleus stretch  30\" 2 6/10: unilateral, comments of clicking in anterior ankle   Standing HR/TR Half roll 1 20 ^5/17         sit EOB hamstring stretch  30\" 2 Added 5/11   SLR - flexion 2# Cueing for quad activation   sidelying hip abduction 2# Added 5/11   Supine bridges  5/17 - cueing for glute activation and push through LE's.     Ankle inversion/eversion lime Poor eccentric control with blue TB                               Therapeutic Activities (78127)         5'        Forward step ups 4\" step 5/17   Leg press DL 80#     Cueing for heel/toe pattern   Bungee walking yellow    Reassessment of objective measures, updated prognosis, provided pt with compression stocking and pt ed regarding use for traveling    6/8   HEP:   - ankle alphabet   - towel srcunches  - standing gastroc/soleus stretches - standing HF stretch w/ step   - HR  - tandem balance   - resisted 4-way ankle   x22 min      Neuromuscular Re-ed (00778)       Added 5/11   biodex balance PS L10   For improved proprioception and balance Manual Intervention (03410)              Tib/fib, talocrural Mobs  3'     Ankle mobs/PROM  6'                                     Modalities:     Pt. Education:  -pt educated on diagnosis, prognosis and expectations for rehab  -all pt questions were answered  -5/19 pt. To wean from crutch in the home, continue to use outside of the home    Home Exercise Program:  Access Code: [email or web address]  URL: Parental Health/  Date: 05/06/2022  Prepared by: Bucky Cast    Exercises  Seated Ankle Alphabet - 2 x daily - 7 x weekly - 2 reps  Long Sitting Calf Stretch with Strap - 2 x daily - 7 x weekly - 3 reps - 20s hold  Seated Soleus Towel Stretch - 2 x daily - 7 x weekly - 3 sets - 10 reps  Seated Heel Toe Raises - 2 x daily - 7 x weekly - 2 sets - 10 reps  Towel Scrunches - 2 x daily - 7 x weekly - 2 sets - 10 reps  Ankle Inversion Eversion Towel Slide - 2 x daily - 7 x weekly - 2 sets - 10 reps  Straight Leg Raise - 2 x daily - 7 x weekly - 2 sets - 10 reps    6/10:  Access Code: [email or web address]  URL: Parental Health/  Date: 06/10/2022  Prepared by: Arlyne Range    Exercises  Seated Ankle Alphabet - 1 x daily - 7 x weekly - 2 reps  Towel Scrunches - 1 x daily - 7 x weekly - 2 sets - 10 reps  Ankle Inversion Eversion Towel Slide - 1 x daily - 7 x weekly - 2 sets - 10 reps  Straight Leg Raise - 1 x daily - 7 x weekly - 2 sets - 10 reps  Standing Gastroc Stretch at Counter - 1 x daily - 7 x weekly - 2 sets - 30 reps  Standing Soleus Stretch at Counter - 1 x daily - 7 x weekly - 2 sets - 30 reps  Hip Flexor Stretch on Step - 1 x daily - 7 x weekly - 2 sets - 30 reps  Heel rises with counter support - 1 x daily - 7 x weekly - 2 sets - 10 reps  Standing Tandem Balance with Counter Support - 1 x daily - 7 x weekly - 2 sets - 30 reps  Ankle Dorsiflexion with Resistance - 1 x daily - 7 x weekly - 2 sets - 10 reps  Ankle and Toe Plantarflexion with Resistance - 1 x daily - 7 x weekly - 2 sets - 10 reps  Ankle Eversion with Resistance - 1 x daily - 7 x weekly - 2 sets - 10 reps  Ankle Inversion with Resistance - 1 x daily - 7 x weekly - 2 sets - 10 reps      Therapeutic Exercise and NMR EXR  [x] (09730) Provided verbal/tactile cueing for activities related to strengthening, flexibility, endurance, ROM for improvements in LE, proximal hip, and core control with self care, mobility, lifting, ambulation.  [] (17641) Provided verbal/tactile cueing for activities related to improving balance, coordination, kinesthetic sense, posture, motor skill, proprioception  to assist with LE, proximal hip, and core control in self care, mobility, lifting, ambulation and eccentric single leg control.   [] (52357) Therapist is in constant attendance of 2 or more patients providing skilled therapy interventions, but not providing any significant amount of measurable one-on-one time to either patient, for improvements in LE, proximal hip, and core control in self care, mobility, lifting, ambulation and eccentric single leg control.      NMR and Therapeutic Activities:    [x] (95614 or 43470) Provided verbal/tactile cueing for activities related to improving balance, coordination, kinesthetic sense, posture, motor skill, proprioception and motor activation to allow for proper function of core, proximal hip and LE with self care and ADLs  [] (48783) Gait Re-education- Provided training and instruction to the patient for proper LE, core and proximal hip recruitment and positioning and eccentric body weight control with ambulation re-education including up and down stairs     Home Exercise Program:    [x] (39430) Reviewed/Progressed HEP activities related to strengthening, flexibility, endurance, ROM of core, proximal hip and LE for functional self-care, mobility, lifting and ambulation/stair navigation   [] (59938)Reviewed/Progressed HEP activities related to improving balance, coordination, kinesthetic sense, posture, motor skill, proprioception of core, proximal hip and LE for self care, mobility, lifting, and ambulation/stair navigation      Manual Treatments:  PROM / STM / Oscillations-Mobs:  G-I, II, III, IV (PA's, Inf., Post.)  [x] (78188) Provided manual therapy to mobilize LE, proximal hip and/or LS spine soft tissue/joints for the purpose of modulating pain, promoting relaxation,  increasing ROM, reducing/eliminating soft tissue swelling/inflammation/restriction, improving soft tissue extensibility and allowing for proper ROM for normal function with self care, mobility, lifting and ambulation. Modalities:  [] (24463) Vasopneumatic compression: Utilized vasopneumatic compression to decrease edema / swelling for the purpose of improving mobility and quad tone / recruitment which will allow for increased overall function including but not limited to self-care, transfers, ambulation, and ascending / descending stairs. Charges:  Timed Code Treatment Minutes: 43   Total Treatment Minutes: 43     [] EVAL - LOW (01034)   [] EVAL - MOD (25058)  [] EVAL - HIGH (80375)  [] RE-EVAL (55250)  [x] FX(30621) x 1      [] Ionto   [] NMR (73310) x       [] Vaso  [x] Manual (14925) x 1      [] Ultrasound  [x] TA x  1      [] Mech Traction (41950)  [] Aquatic Therapy x      [] ES (un) (77028):   [] Home Management Training x  [] ES(attended) (88942)   [] Dry Needling 1-2 muscles (62111):  [] Dry Needling 3+ muscles (964070)  [] Group:      [] Other:     GOALS:   Patient stated goal: walk normal  [x] Progressing: [] Met: [] Not Met: [] Adjusted    Therapist goals for Patient:   Short Term Goals: To be achieved in: 2 weeks  1. Independent in HEP and progression per patient tolerance, in order to prevent re-injury. [x] Progressing: [] Met: [] Not Met: [] Adjusted  2. Patient will have a decrease in pain <3/10 to facilitate improvement in movement, function, and ADLs as indicated by Functional Deficits.   [x] Progressing: [] Met: [] Not Met: [] Adjusted    Long Term Goals: To be achieved in: 10-12 weeks  1. FOTO score of at least 59 to assist with reaching prior level of function. [x] Progressing: [] Met: [] Not Met: [] Adjusted  2. Patient will demonstrate increased AROM to at least L ankle DF 5, PF 40 to allow for proper joint functioning as indicated by patients Functional Deficits. [x] Progressing: [] Met: [] Not Met: [] Adjusted  3. Patient will demonstrate an increase in Strength to at least 4+/5 throughout as well as good proximal hip strength and control to allow for proper functional mobility as indicated by patients Functional Deficits. [x] Progressing: [] Met: [] Not Met: [] Adjusted  4. Patient will return to functional activities including walking on even ground with minimal deviations and no AD without increased symptoms or restriction. [x] Progressing: [] Met: [] Not Met: [] Adjusted  5. Patient will return to reciprocal stairs without increased symptoms. [x] Progressing: [] Met: [] Not Met: [] Adjusted     Overall Progression Towards Functional goals/ Treatment Progress Update:  [] Patient is progressing as expected towards functional goals listed. [] Progression is slowed due to complexities/Impairments listed. [] Progression has been slowed due to co-morbidities. [x] Plan just implemented, too soon to assess goals progression <30days   [] Goals require adjustment due to lack of progress  [] Patient is not progressing as expected and requires additional follow up with physician  [] Other    Persisting Functional Limitations/Impairments:  []Sitting [x]Standing   [x]Walking [x]Stairs   [x]Transfers [x]ADLs   [x]Squatting/bending []Kneeling  [x]Housework []Job related tasks  [x]Driving [x]Sports/Recreation   [x]Sleeping []Other:    ASSESSMENT: Pt demonstrated understanding and compliance with updated HEP.  She continues to lack eccentric control with resisted ankle motion, so band resistance was adjusted to orange; orange resistance provided patient with a challenge, as well as demonstrated better, controlled motion concentrically and eccentrically. Pt contiues to get relief with manual stretching, so it was continued this session. Pt was instructed on completing simple ROM exercises such as ankle pumps and ankle alphabet while on the plane to deter stiffness. She also was instructed on wearing compression sock during flights to mitigate swelling from continued dependent positioning throughout flights. Pt to benefit from continued skilled therapy upon return from [facility] in order to address continued deficits in L ankle ROM, strength, balance and gait abnormalities. Treatment/Activity Tolerance:  [x] Pt able to complete treatment [] Patient limited by fatique  [] Patient limited by pain  [] Patient limited by other medical complications  [] Other:     Prognosis:  [x] Good [] Fair  [] Poor    Patient Requires Follow-up: [x] Yes  [] No    Return to Play:    [x]  N/A   []  Stage 1: Intro to Strength   []  Stage 2: Return to Run and Strength   []  Stage 3: Return to Jump and Strength   []  Stage 4: Dynamic Strength and Agility   []  Stage 5: Sport Specific Training   []  Ready to Return to Play, Meets All Above Stages   []  Not Ready for Return to Sports   Comments:            PLAN: See eval. PT 1-2x / week for 10-12 weeks. [x] Continue per plan of care [] Alter current plan (see comments)  [] Plan of care initiated [] Hold pending MD visit [] Discharge    Electronically signed by: Varsha De La Vega, PT, DPT  Therapist was present, directed the patient's care, made skilled judgement, and was responsible for assessment and treatment of the patient. Kayley Greenfield, SPT    Note: If patient does not return for scheduled/ recommended follow up visits, this note will serve as a discharge from care along with most recent update on progress.

## 2022-06-14 ENCOUNTER — APPOINTMENT (OUTPATIENT)
Dept: PHYSICAL THERAPY | Age: 32
End: 2022-06-14
Payer: COMMERCIAL

## 2022-06-16 ENCOUNTER — APPOINTMENT (OUTPATIENT)
Dept: PHYSICAL THERAPY | Age: 32
End: 2022-06-16
Payer: COMMERCIAL

## 2022-06-21 ENCOUNTER — APPOINTMENT (OUTPATIENT)
Dept: PHYSICAL THERAPY | Age: 32
End: 2022-06-21
Payer: COMMERCIAL

## 2022-06-23 ENCOUNTER — APPOINTMENT (OUTPATIENT)
Dept: PHYSICAL THERAPY | Age: 32
End: 2022-06-23
Payer: COMMERCIAL

## 2022-06-28 ENCOUNTER — APPOINTMENT (OUTPATIENT)
Dept: PHYSICAL THERAPY | Age: 32
End: 2022-06-28
Payer: COMMERCIAL

## 2022-06-30 ENCOUNTER — APPOINTMENT (OUTPATIENT)
Dept: PHYSICAL THERAPY | Age: 32
End: 2022-06-30
Payer: COMMERCIAL

## 2022-07-26 ENCOUNTER — OFFICE VISIT (OUTPATIENT)
Dept: ORTHOPEDIC SURGERY | Age: 32
End: 2022-07-26
Payer: COMMERCIAL

## 2022-07-26 VITALS — BODY MASS INDEX: 32.64 KG/M2 | WEIGHT: 228 LBS | HEIGHT: 70 IN

## 2022-07-26 DIAGNOSIS — S82.842A BIMALLEOLAR FRACTURE, LEFT, CLOSED, INITIAL ENCOUNTER: Primary | ICD-10-CM

## 2022-07-26 DIAGNOSIS — S82.892A CLOSED FRACTURE DISLOCATION OF LEFT ANKLE, INITIAL ENCOUNTER: ICD-10-CM

## 2022-07-26 PROCEDURE — G8427 DOCREV CUR MEDS BY ELIG CLIN: HCPCS | Performed by: NURSE PRACTITIONER

## 2022-07-26 PROCEDURE — G8417 CALC BMI ABV UP PARAM F/U: HCPCS | Performed by: NURSE PRACTITIONER

## 2022-07-26 PROCEDURE — 1036F TOBACCO NON-USER: CPT | Performed by: NURSE PRACTITIONER

## 2022-07-26 PROCEDURE — 99213 OFFICE O/P EST LOW 20 MIN: CPT | Performed by: NURSE PRACTITIONER

## 2022-07-26 NOTE — PROGRESS NOTES
DIAGNOSIS:    1-Left ankle fracture dislocation, status post ORIF, date of surgery, 11/22/2021. 2-Left ankle distal tibiofibular syndesmosis disruption, status post ORIF syndesmosis screw, status post syndesmosis screw removal 4/7/2022      HISTORY OF PRESENT ILLNESS:  Ms. Randell Benavides 28 y.o. female who came in today for postoperative visit. The patient denies any significant pain in the left ankle. Rates pain a 0/10 VAS and is doing very well. She has been i working with physical therapy and was given a home exercise program.  She still has 10 visits left, but is leaving to go down to [facility] to take care of her ill mother. Overall she is very happy with her surgery. No numbness or tingling sensation. No fever or Chills. Denies smoking.     Past Medical History:   Diagnosis Date    Anxiety     PONV (postoperative nausea and vomiting)     x 2 weeks post op     Past Surgical History:   Procedure Laterality Date    ANKLE FRACTURE SURGERY Left 11/22/2021    OPEN REDUCTION INTERNAL FIXATION LEFT ANKLE performed by Leopold Grant, MD at [facility] Left 4/7/2022    LEFT ANKLE SYNDESMOSIS SCREW REMOVAL performed by Leopold Grant, MD at [place] [address] N/A 5/12/2022    EGD BIOPSY performed by Linnea Matias MD at [facility]     Family History   Problem Relation Age of Onset    No Known Problems Mother     High Blood Pressure Father      Social History     Socioeconomic History    Marital status:      Spouse name: Not on file    Number of children: Not on file    Years of education: Not on file    Highest education level: Not on file   Occupational History    Not on file   Tobacco Use    Smoking status: Never    Smokeless tobacco: Never   Vaping Use    Vaping Use: Never used   Substance and Sexual Activity    Alcohol use: Not Currently    Drug use: Never    Sexual activity: Not on file   Other Topics Concern    Not on file   Social History Narrative    Not on file Social Determinants of Health     Financial Resource Strain: Not on file   Food Insecurity: Not on file   Transportation Needs: Not on file   Physical Activity: Not on file   Stress: Not on file   Social Connections: Not on file   Intimate Partner Violence: Not on file   Housing Stability: Not on file     Current Outpatient Medications   Medication Sig Dispense Refill    Probiotic Product (PROBIOTIC ADVANCED PO) Take by mouth      Ergocalciferol (VITAMIN D2 PO) Take by mouth daily      Multiple Vitamins-Minerals (THERAPEUTIC MULTIVITAMIN-MINERALS) tablet Take 1 tablet by mouth daily      vitamin B-12 (CYANOCOBALAMIN) 500 MCG tablet Take 500 mcg by mouth daily      omeprazole (PRILOSEC) 40 MG delayed release capsule Take 40 mg by mouth daily      buPROPion (WELLBUTRIN SR) 150 MG extended release tablet Take 150 mg by mouth daily       No current facility-administered medications for this visit. Pertinent items are noted in HPI  Review of systems reviewed from Patient History Form and available in the patient's chart under the Media tab. No change noted. PHYSICAL EXAMINATION:  Ms. Aline Bonilla is a very pleasant 28 y.o. female who presents today in no acute distress, awake, alert, and oriented. She is well dressed, nourished and  groomed. Patient with normal affect. Height is  5' 10\" (1.778 m), weight is 228 lb (103.4 kg), Body mass index is 32.71 kg/m². Resting respiratory rate is 16. The patient walks with no limp. The incision healing well. No signs of any erythema or drainage, mild to no swelling. She has no pain with the active or passive range of motion of the left ankle, full ROM. She has intact sensation distally, and she is neurovascularly intact. Ankle reflex 1+ bilaterally. Good strength 5/5, and no instability both upper and lower extremities.     IMAGING:  Three views left ankle taken today in the office showed anatomic alignment of the fracture, plate and screws in good position, no loosening. Ankle mortise is well centered. Syndesmosis screw has since been removed. IMPRESSION:    1-Left ankle fracture dislocation, status post ORIF, date of surgery 11/22/2021  2-Left ankle distal tibiofibular syndesmosis disruption, status post ORIF syndesmosis screw, with status post syndesmosis screw removal 4/7/2022    PLAN:  She can go back to normal activity with no restrictions. She was instructed to continue and complete her PT and to be given a home exercise program.  She will be seen PRN. I told the patient that it is not unusual to have some achy pain and swelling for up to a year after a fracture.           Ck Merlos, APRN - CNP

## 2022-07-28 ENCOUNTER — HOSPITAL ENCOUNTER (OUTPATIENT)
Dept: PHYSICAL THERAPY | Age: 32
Setting detail: THERAPIES SERIES
Discharge: HOME | End: 2022-07-28
Payer: COMMERCIAL

## 2022-07-28 PROCEDURE — 97112 NEUROMUSCULAR REEDUCATION: CPT

## 2022-07-28 PROCEDURE — 97140 MANUAL THERAPY 1/> REGIONS: CPT

## 2022-07-28 PROCEDURE — 97530 THERAPEUTIC ACTIVITIES: CPT

## 2022-07-28 NOTE — PLAN OF CARE
[address]  Phone: [phone]   Fax: [phone]  Physical Therapy Re-Certification Plan of Care    Dear JUANPABLO Santamaria -*  ,    We had the pleasure of treating the following patient for physical therapy services at [facility]. A summary of our findings can be found in the updated assessment below. This includes our plan of care. If you have any questions or concerns regarding these findings, please do not hesitate to contact me at the office phone number checked above. Thank you for the referral.     Physician Signature:________________________________Date:__________________  By signing above (or electronic signature), therapist's plan is approved by physician      Functional Outcome:   FOTO physical FS primary measure score = 26; risk adjusted = 49  22  FOTO physical FS primary measure score = 44     22  FOTO physical FS primary measure score = 60     22    Overall Response to Treatment:   []Patient is responding well to treatment and improvement is noted with regards  to goals   []Patient should continue to improve in reasonable time if they continue HEP   []Patient has plateaued and is no longer responding to skilled PT intervention    []Patient is getting worse and would benefit from return to referring MD   []Patient unable to adhere to initial POC   [x]Other: See Assessment    Date range of Visits: 22-22  Total Visits: 9    Recommendation:    [x]Continue PT 2x / wk for 4 weeks.                []Hold PT, pending MD visit     Physical Therapy Treatment Note/ Progress Report:     Date:  2022    Patient Name:  Shayla Anderson    :  1990  MRN: [id number]  Restrictions/Precautions:    Medical/Treatment Diagnosis Information:  Diagnosis: S82.842A (ICD-10-CM) - Bimalleolar fracture, left, closed, initial encounter; [id number] (ICD-10-CM) - Syndesmotic disruption of ankle, initial encounter  Treatment Diagnosis: L ankle pain, decreased mobility, decreased LE strength limiting gait  Insurance/Certification information:  PT Insurance Information: [facility] [employer]  Physician Information:  Nicole Cabrera, APRN - CNP   Plan of care signed (Y/N): [x]  Yes -  []  No     Date of Patient follow up with Physician: PRN      Progress Report: [x]  Yes  []  No     Date Range for reporting period:  Beginnin22  PN: 22  POC: 22  Ending:     Progress report due (10 Rx/or 30 days whichever is less): visit #19 or 88 (date)     Recertification due (POC duration/ or 90 days whichever is less): visit #30 or 10/28/22 (date)     Visit # Insurance Allowable Auth required? Date Range   9 30 []  Yes  [x]  No n/a       Latex Allergy:  [x]NO      []YES  Preferred Language for Healthcare:   [x]English       []other:    Functional Scale:           Date assessed:  FOTO physical FS primary measure score = 26; risk adjusted = 49  22  FOTO physical FS primary measure score = 44     22  FOTO physical FS primary measure score = 60     22    Pain level:  5/10  Ankle       SUBJECTIVE:  Pt reports that she feels good upon arrival. She states that she has some soreness in her calf and in her joint, but it is tolerable.  She states that tolerated last session well.      21 L ankle ORIF, 22 hardware removal    OBJECTIVE:     Girth (cm) L - pre vaso / post vaso R - pre-vaso / post-vaso   Figure 8 55.0 53.0          :  Gait Analysis: no heel strike L, decreased push off on L, decreased L stance time      PROM AROM     L R L R   Knee Flexion     [facility]   Knee Extension     [facility]   Dorsiflexion      11 deg 5   Plantarflexion      64 deg 40   Inversion      32 30   Eversion      42 26     Strength (0-5) / Myotomes Left Right   Hip Flexion - seated (L1-2) 4 4+   Hip Abduction 4 4   Quads (L2-4) 4 5   Hamstrings 4 4+   Ankle Dorsiflexion (L4-5) 4+ 4+   Ankle Plantarflexion (S1-2) 4+ 4+   Ankle Inversion 4 4+   Ankle Eversion (S1-2) 4 4+   Great Toe Extension (L5)               Girth (cm)       Figure 8       Balance:  SLS: L: 10+ sec R: >15sec w/ ankle strategy    RESTRICTIONS/PRECAUTIONS: 11/22/21 L ankle ORIF, 4/7/22 hardware removal    No WB or ROM restrictions at this time    Exercises/Interventions:     Therapeutic Exercise (69427)  Resistance / level Sets/sec Reps Notes / Cues   Recumbent bike  5'     Ankle alphabet      IB gastroc stretch  30\" 2    IB soleus stretch  30\" 2 6/10: unilateral, comments of clicking in anterior ankle   Standing HR/TR Half roll 1 20 ^5/17        sit EOB hamstring stretch  Added 5/11   SLR - flexion 2# Cueing for quad activation   sidelying hip abduction 2# Added 5/11   Supine bridges  5/17 - cueing for glute activation and push through LE's. Ankle inversion/eversion lime Poor eccentric control with blue TB                               Therapeutic Activities (26526)        5'        Forward step ups 4\" step 5/17   Leg press DL 80#     Cueing for heel/toe pattern   Bungee walking yellow    Reassessment of objective measures, updated prognosis,  10 min  6/8   HEP:   - ankle alphabet   - towel srcunches  - standing gastroc/soleus stretches - standing HF stretch w/ step   - HR  - tandem balance   - resisted 4-way ankle       Neuromuscular Re-ed (04358)       Added 5/11   biodex balance PS L10  For improved proprioception and balance   SL with UE perturbation  1 10 arm swings each plane    Airex:  Narrow DAVEY EC  Staggered EC    30\"  30\"   1  1    BOSU Step up  2 10 L    Ant tap down 4\" 1 10 L    Resisted side stepping Lime at ankle 20 ft 2 laps    Manual Intervention (76575)                     Tib/fib, talocrural Mobs  3'     Ankle mobs/PROM  6'                                     Modalities:   Pt. Education:  -pt educated on diagnosis, prognosis and expectations for rehab  -all pt questions were answered  -5/19 pt.  To wean from crutch in the home, continue to use outside of the home    Home Exercise Program:  Access Code: [email or web address]  URL: "[employer]/  Date: 05/06/2022  Prepared by: Christopher Chaney    Exercises  Seated Ankle Alphabet - 2 x daily - 7 x weekly - 2 reps  Long Sitting Calf Stretch with Strap - 2 x daily - 7 x weekly - 3 reps - 20s hold  Seated Soleus Towel Stretch - 2 x daily - 7 x weekly - 3 sets - 10 reps  Seated Heel Toe Raises - 2 x daily - 7 x weekly - 2 sets - 10 reps  Towel Scrunches - 2 x daily - 7 x weekly - 2 sets - 10 reps  Ankle Inversion Eversion Towel Slide - 2 x daily - 7 x weekly - 2 sets - 10 reps  Straight Leg Raise - 2 x daily - 7 x weekly - 2 sets - 10 reps    6/10:  Access Code: [email or web address]  URL: "[employer]/  Date: 06/10/2022  Prepared by: Jennifer Garrett    Exercises  Seated Ankle Alphabet - 1 x daily - 7 x weekly - 2 reps  Towel Scrunches - 1 x daily - 7 x weekly - 2 sets - 10 reps  Ankle Inversion Eversion Towel Slide - 1 x daily - 7 x weekly - 2 sets - 10 reps  Straight Leg Raise - 1 x daily - 7 x weekly - 2 sets - 10 reps  Standing Gastroc Stretch at Counter - 1 x daily - 7 x weekly - 2 sets - 30 reps  Standing Soleus Stretch at Counter - 1 x daily - 7 x weekly - 2 sets - 30 reps  Hip Flexor Stretch on Step - 1 x daily - 7 x weekly - 2 sets - 30 reps  Heel rises with counter support - 1 x daily - 7 x weekly - 2 sets - 10 reps  Standing Tandem Balance with Counter Support - 1 x daily - 7 x weekly - 2 sets - 30 reps  Ankle Dorsiflexion with Resistance - 1 x daily - 7 x weekly - 2 sets - 10 reps  Ankle and Toe Plantarflexion with Resistance - 1 x daily - 7 x weekly - 2 sets - 10 reps  Ankle Eversion with Resistance - 1 x daily - 7 x weekly - 2 sets - 10 reps  Ankle Inversion with Resistance - 1 x daily - 7 x weekly - 2 sets - 10 reps      Therapeutic Exercise and NMR EXR  [x] (11775) Provided verbal/tactile cueing for activities related to strengthening, flexibility, endurance, ROM for improvements in LE, proximal hip, and core control with self care, mobility, lifting, ambulation.  [] (45102) Provided verbal/tactile cueing for activities related to improving balance, coordination, kinesthetic sense, posture, motor skill, proprioception  to assist with LE, proximal hip, and core control in self care, mobility, lifting, ambulation and eccentric single leg control.   [] (81729) Therapist is in constant attendance of 2 or more patients providing skilled therapy interventions, but not providing any significant amount of measurable one-on-one time to either patient, for improvements in LE, proximal hip, and core control in self care, mobility, lifting, ambulation and eccentric single leg control.      NMR and Therapeutic Activities:    [x] (81313 or 45750) Provided verbal/tactile cueing for activities related to improving balance, coordination, kinesthetic sense, posture, motor skill, proprioception and motor activation to allow for proper function of core, proximal hip and LE with self care and ADLs  [] (92356) Gait Re-education- Provided training and instruction to the patient for proper LE, core and proximal hip recruitment and positioning and eccentric body weight control with ambulation re-education including up and down stairs     Home Exercise Program:    [x] (07719) Reviewed/Progressed HEP activities related to strengthening, flexibility, endurance, ROM of core, proximal hip and LE for functional self-care, mobility, lifting and ambulation/stair navigation   [] (66929)Reviewed/Progressed HEP activities related to improving balance, coordination, kinesthetic sense, posture, motor skill, proprioception of core, proximal hip and LE for self care, mobility, lifting, and ambulation/stair navigation      Manual Treatments:  PROM / STM / Oscillations-Mobs:  G-I, II, III, IV (PA's, Inf., Post.)  [x] (60336) Provided manual therapy to mobilize LE, proximal hip and/or LS spine soft tissue/joints for the purpose of modulating pain, promoting relaxation,  increasing ROM, reducing/eliminating soft tissue swelling/inflammation/restriction, improving soft tissue extensibility and allowing for proper ROM for normal function with self care, mobility, lifting and ambulation. Modalities:  [] (53273) Vasopneumatic compression: Utilized vasopneumatic compression to decrease edema / swelling for the purpose of improving mobility and quad tone / recruitment which will allow for increased overall function including but not limited to self-care, transfers, ambulation, and ascending / descending stairs. Charges:  Timed Code Treatment Minutes: 43   Total Treatment Minutes: 43     [] EVAL - LOW (67512)   [] EVAL - MOD (08852)  [] EVAL - HIGH (98735)  [] RE-EVAL (60015)  [] VF(20819) x       [] Ionto   [x] NMR (69533) x 1      [] Vaso  [x] Manual (37465) x 1      [] Ultrasound  [x] TA x  1      [] Mech Traction (95414)  [] Aquatic Therapy x      [] ES (un) (82902):   [] Home Management Training x  [] ES(attended) (18095)   [] Dry Needling 1-2 muscles (72348):  [] Dry Needling 3+ muscles (517251)  [] Group:      [] Other:     GOALS:   Patient stated goal: walk normal  [x] Progressing: [] Met: [] Not Met: [] Adjusted    Therapist goals for Patient:   Short Term Goals: To be achieved in: 2 weeks  1. Independent in HEP and progression per patient tolerance, in order to prevent re-injury. [x] Progressing: [] Met: [] Not Met: [] Adjusted  2. Patient will have a decrease in pain <3/10 to facilitate improvement in movement, function, and ADLs as indicated by Functional Deficits. [x] Progressing: [] Met: [] Not Met: [] Adjusted    Long Term Goals: To be achieved in: 10-12 weeks  1. FOTO score of at least 59 to assist with reaching prior level of function. [x] Progressing: [] Met: [] Not Met: [] Adjusted  2.  Patient will demonstrate increased AROM to at least L ankle DF 5, PF 40 to allow for proper joint functioning as indicated by patients Functional Deficits. [x] Progressing: [] Met: [] Not Met: [] Adjusted  3. Patient will demonstrate an increase in Strength to at least 4+/5 throughout as well as good proximal hip strength and control to allow for proper functional mobility as indicated by patients Functional Deficits. [x] Progressing: [] Met: [] Not Met: [] Adjusted  4. Patient will return to functional activities including walking on even ground with minimal deviations and no AD without increased symptoms or restriction. [x] Progressing: [] Met: [] Not Met: [] Adjusted  5. Patient will return to reciprocal stairs without increased symptoms. [x] Progressing: [] Met: [] Not Met: [] Adjusted     Overall Progression Towards Functional goals/ Treatment Progress Update:  [] Patient is progressing as expected towards functional goals listed. [] Progression is slowed due to complexities/Impairments listed. [] Progression has been slowed due to co-morbidities. [x] Plan just implemented, too soon to assess goals progression <30days   [] Goals require adjustment due to lack of progress  [] Patient is not progressing as expected and requires additional follow up with physician  [] Other    Persisting Functional Limitations/Impairments:  []Sitting [x]Standing   [x]Walking [x]Stairs   [x]Transfers [x]ADLs   [x]Squatting/bending []Kneeling  [x]Housework []Job related tasks  [x]Driving [x]Sports/Recreation   [x]Sleeping []Other:    ASSESSMENT: Pt is a 27 yo female referred to PT s/p hardware removal occurring 4/7/22 following L ankle ORIF 11/22/21. She has been seen for 9 visits in PT. In mid June she traveled to [facility] for family reasons. She returns this date to resume PT. She presents with significantly improved gross L ankle AROM, gross L ankle strength, balance, and gait pattern compared to the last progress note performed 6/8/22.  She continues to present with deficits in DF and PF, gross strength (deficits are significant, eccentric control is

## 2022-08-04 ENCOUNTER — HOSPITAL ENCOUNTER (OUTPATIENT)
Dept: PHYSICAL THERAPY | Age: 32
Setting detail: THERAPIES SERIES
Discharge: HOME | End: 2022-08-04
Payer: COMMERCIAL

## 2022-08-04 PROCEDURE — 97110 THERAPEUTIC EXERCISES: CPT

## 2022-08-04 PROCEDURE — 97140 MANUAL THERAPY 1/> REGIONS: CPT

## 2022-08-04 PROCEDURE — 97112 NEUROMUSCULAR REEDUCATION: CPT

## 2022-08-04 NOTE — FLOWSHEET NOTE
L R L R   Knee Flexion     [facility]   Knee Extension     [facility]   Dorsiflexion      11 deg 5   Plantarflexion      64 deg 40   Inversion      32 30   Eversion      42 26     Strength (0-5) / Myotomes Left Right   Hip Flexion - seated (L1-2) 4 4+   Hip Abduction 4 4   Quads (L2-4) 4 5   Hamstrings 4 4+   Ankle Dorsiflexion (L4-5) 4+ 4+   Ankle Plantarflexion (S1-2) 4+ 4+   Ankle Inversion 4 4+   Ankle Eversion (S1-2) 4 4+   Great Toe Extension (L5)               Girth (cm)       Figure 8       Balance:  SLS: L: 10+ sec R: >15sec w/ ankle strategy    RESTRICTIONS/PRECAUTIONS: 11/22/21 L ankle ORIF, 4/7/22 hardware removal    No WB or ROM restrictions at this time    Exercises/Interventions:     Therapeutic Exercise (26994)  Resistance / level Sets/sec Reps Notes / Cues   Recumbent bike  5'     Ankle alphabet      IB gastroc stretch  30\" 2    IB soleus stretch  30\" 2 6/10: unilateral, comments of clicking in anterior ankle   Standing HR/TR Half roll 1 20 ^5/17        sit EOB hamstring stretch  Added 5/11   SLR - flexion 2# Cueing for quad activation   sidelying hip abduction 2# Added 5/11   Supine bridges  5/17 - cueing for glute activation and push through LE's. Ankle inversion/eversion lime Poor eccentric control with blue TB   TG squat Npv?                            Therapeutic Activities (20779)        5'        Forward step ups 4\" step 5/17   Leg press DL 80#     Cueing for heel/toe pattern   Bungee walking yellow    Reassessment of objective measures, updated prognosis,   6/8   HEP:   - ankle alphabet   - towel srcunches  - standing gastroc/soleus stretches - standing HF stretch w/ step   - HR  - tandem balance   - resisted 4-way ankle       Neuromuscular Re-ed (65251)       Added 5/11   biodex balance  Ball Maze PS L10  L 6 57 sec, 41 sec   For improved proprioception and balance   SL with UE perturbation  1 10 arm swings each plane    Tandem Balance  30\" 2 B    Airex:  Narrow DAVEY EC  Staggered EC    30\"   1 BOSU Step up  2 10 L    Ant tap down 4\" 1 10 L    Resisted side stepping Lime at ankle 20 ft 2 laps           Manual Intervention (06785)                     Tib/fib, talocrural Mobs  3'     Ankle mobs/PROM  6'                                     Modalities:   Pt. Education:  -pt educated on diagnosis, prognosis and expectations for rehab  -all pt questions were answered  -5/19 pt. To wean from crutch in the home, continue to use outside of the home    Home Exercise Program:  Access Code: [email or web address]  URL: [facility] [employer]/  Date: 05/06/2022  Prepared by: Gena Grierhorn    Exercises  Seated Ankle Alphabet - 2 x daily - 7 x weekly - 2 reps  Long Sitting Calf Stretch with Strap - 2 x daily - 7 x weekly - 3 reps - 20s hold  Seated Soleus Towel Stretch - 2 x daily - 7 x weekly - 3 sets - 10 reps  Seated Heel Toe Raises - 2 x daily - 7 x weekly - 2 sets - 10 reps  Towel Scrunches - 2 x daily - 7 x weekly - 2 sets - 10 reps  Ankle Inversion Eversion Towel Slide - 2 x daily - 7 x weekly - 2 sets - 10 reps  Straight Leg Raise - 2 x daily - 7 x weekly - 2 sets - 10 reps    6/10:  Access Code: [email or web address]  URL: [facility]/  Date: 06/10/2022  Prepared by: Tomasa Blunt    Exercises  Seated Ankle Alphabet - 1 x daily - 7 x weekly - 2 reps  Towel Scrunches - 1 x daily - 7 x weekly - 2 sets - 10 reps  Ankle Inversion Eversion Towel Slide - 1 x daily - 7 x weekly - 2 sets - 10 reps  Straight Leg Raise - 1 x daily - 7 x weekly - 2 sets - 10 reps  Standing Gastroc Stretch at Counter - 1 x daily - 7 x weekly - 2 sets - 30 reps  Standing Soleus Stretch at Counter - 1 x daily - 7 x weekly - 2 sets - 30 reps  Hip Flexor Stretch on Step - 1 x daily - 7 x weekly - 2 sets - 30 reps  Heel rises with counter support - 1 x daily - 7 x weekly - 2 sets - 10 reps  Standing Tandem Balance with Counter Support - 1 x daily - 7 x weekly - 2 sets - 30 reps  Ankle Dorsiflexion with Resistance - 1 x daily - 7 x weekly - 2 sets - 10 reps  Ankle and Toe Plantarflexion with Resistance - 1 x daily - 7 x weekly - 2 sets - 10 reps  Ankle Eversion with Resistance - 1 x daily - 7 x weekly - 2 sets - 10 reps  Ankle Inversion with Resistance - 1 x daily - 7 x weekly - 2 sets - 10 reps      Therapeutic Exercise and NMR EXR  [x] (07078) Provided verbal/tactile cueing for activities related to strengthening, flexibility, endurance, ROM for improvements in LE, proximal hip, and core control with self care, mobility, lifting, ambulation.  [] (12828) Provided verbal/tactile cueing for activities related to improving balance, coordination, kinesthetic sense, posture, motor skill, proprioception  to assist with LE, proximal hip, and core control in self care, mobility, lifting, ambulation and eccentric single leg control.   [] (56257) Therapist is in constant attendance of 2 or more patients providing skilled therapy interventions, but not providing any significant amount of measurable one-on-one time to either patient, for improvements in LE, proximal hip, and core control in self care, mobility, lifting, ambulation and eccentric single leg control.      NMR and Therapeutic Activities:    [x] (72407 or 83544) Provided verbal/tactile cueing for activities related to improving balance, coordination, kinesthetic sense, posture, motor skill, proprioception and motor activation to allow for proper function of core, proximal hip and LE with self care and ADLs  [] (07830) Gait Re-education- Provided training and instruction to the patient for proper LE, core and proximal hip recruitment and positioning and eccentric body weight control with ambulation re-education including up and down stairs     Home Exercise Program:    [x] (72584) Reviewed/Progressed HEP activities related to strengthening, flexibility, endurance, ROM of core, proximal hip and LE for functional self-care, mobility, lifting and ambulation/stair navigation   [] (63103)Reviewed/Progressed HEP activities related to improving balance, coordination, kinesthetic sense, posture, motor skill, proprioception of core, proximal hip and LE for self care, mobility, lifting, and ambulation/stair navigation      Manual Treatments:  PROM / STM / Oscillations-Mobs:  G-I, II, III, IV (PA's, Inf., Post.)  [x] (33474) Provided manual therapy to mobilize LE, proximal hip and/or LS spine soft tissue/joints for the purpose of modulating pain, promoting relaxation,  increasing ROM, reducing/eliminating soft tissue swelling/inflammation/restriction, improving soft tissue extensibility and allowing for proper ROM for normal function with self care, mobility, lifting and ambulation. Modalities:  [] (29659) Vasopneumatic compression: Utilized vasopneumatic compression to decrease edema / swelling for the purpose of improving mobility and quad tone / recruitment which will allow for increased overall function including but not limited to self-care, transfers, ambulation, and ascending / descending stairs. Charges:  Timed Code Treatment Minutes: 45   Total Treatment Minutes: 45     [] EVAL - LOW (63600)   [] EVAL - MOD (56787)  [] EVAL - HIGH (20107)  [] RE-EVAL (68891)  [x] UJ(88879) x       [] Ionto   [x] NMR (81274) x 1      [] Vaso  [x] Manual (92065) x 1      [] Ultrasound  [] TA x        [] Mech Traction (15947)  [] Aquatic Therapy x      [] ES (un) (85363):   [] Home Management Training x  [] ES(attended) (06158)   [] Dry Needling 1-2 muscles (83444):  [] Dry Needling 3+ muscles (420591)  [] Group:      [] Other:     GOALS:   Patient stated goal: walk normal  [x] Progressing: [] Met: [] Not Met: [] Adjusted    Therapist goals for Patient:   Short Term Goals: To be achieved in: 2 weeks  1. Independent in HEP and progression per patient tolerance, in order to prevent re-injury. [x] Progressing: [] Met: [] Not Met: [] Adjusted  2.  Patient will have a decrease in pain <3/10 to facilitate improvement in movement, function, and ADLs as indicated by Functional Deficits. [x] Progressing: [] Met: [] Not Met: [] Adjusted    Long Term Goals: To be achieved in: 10-12 weeks  1. FOTO score of at least 59 to assist with reaching prior level of function. [x] Progressing: [] Met: [] Not Met: [] Adjusted  2. Patient will demonstrate increased AROM to at least L ankle DF 5, PF 40 to allow for proper joint functioning as indicated by patients Functional Deficits. [x] Progressing: [] Met: [] Not Met: [] Adjusted  3. Patient will demonstrate an increase in Strength to at least 4+/5 throughout as well as good proximal hip strength and control to allow for proper functional mobility as indicated by patients Functional Deficits. [x] Progressing: [] Met: [] Not Met: [] Adjusted  4. Patient will return to functional activities including walking on even ground with minimal deviations and no AD without increased symptoms or restriction. [x] Progressing: [] Met: [] Not Met: [] Adjusted  5. Patient will return to reciprocal stairs without increased symptoms. [x] Progressing: [] Met: [] Not Met: [] Adjusted     Overall Progression Towards Functional goals/ Treatment Progress Update:  [] Patient is progressing as expected towards functional goals listed. [] Progression is slowed due to complexities/Impairments listed. [] Progression has been slowed due to co-morbidities.   [x] Plan just implemented, too soon to assess goals progression <30days   [] Goals require adjustment due to lack of progress  [] Patient is not progressing as expected and requires additional follow up with physician  [] Other    Persisting Functional Limitations/Impairments:  []Sitting [x]Standing   [x]Walking [x]Stairs   [x]Transfers [x]ADLs   [x]Squatting/bending []Kneeling  [x]Housework []Job related tasks  [x]Driving [x]Sports/Recreation   [x]Sleeping []Other:    ASSESSMENT: Pt continues to present with deficits in DF and PF, gross strength (deficits are significant, eccentric control is poor), rearfoot jt mobility, gait pattern, and balance. She tolerates progressions seen in bold above well with no increase in symptoms. She does fatigue rapidly with anterior step down and reports stiffness at the ant ankle. Continues to present with subtalar and talocural hypomobility most marked with A-P glides likely contributing to difficulty with stair descent. Continue to progress gross ankle and proximal LE strength, balance, and rearfoot mobility. Treatment/Activity Tolerance:  [x] Pt able to complete treatment [] Patient limited by fatique  [] Patient limited by pain  [] Patient limited by other medical complications  [] Other:     Prognosis:  [x] Good [] Fair  [] Poor    Patient Requires Follow-up: [x] Yes  [] No    Return to Play:    [x]  N/A   []  Stage 1: Intro to Strength   []  Stage 2: Return to Run and Strength   []  Stage 3: Return to Jump and Strength   []  Stage 4: Dynamic Strength and Agility   []  Stage 5: Sport Specific Training   []  Ready to Return to Play, Meets All Above Stages   []  Not Ready for Return to Sports   Comments:            PLAN: See eval. PT 1-2x / week for 10-12 weeks. [x] Continue per plan of care [] Alter current plan (see comments)  [] Plan of care initiated [] Hold pending MD visit [] Discharge    Electronically signed by: Soumya Mckeon PT, DPT      Note: If patient does not return for scheduled/ recommended follow up visits, this note will serve as a discharge from care along with most recent update on progress.

## 2022-08-08 ENCOUNTER — HOSPITAL ENCOUNTER (OUTPATIENT)
Dept: PHYSICAL THERAPY | Age: 32
Setting detail: THERAPIES SERIES
Discharge: HOME | End: 2022-08-08
Payer: COMMERCIAL

## 2022-08-08 PROCEDURE — 97110 THERAPEUTIC EXERCISES: CPT

## 2022-08-08 PROCEDURE — 97140 MANUAL THERAPY 1/> REGIONS: CPT

## 2022-08-08 PROCEDURE — 97112 NEUROMUSCULAR REEDUCATION: CPT

## 2022-08-08 NOTE — FLOWSHEET NOTE
[address] [place] [address]  Phone: [phone]   Fax: [phone]     Physical Therapy Treatment Note/ Progress Report:     Date:  2022    Patient Name:  Nicky Shaikh    :  1990  MRN: [id number]  Restrictions/Precautions:    Medical/Treatment Diagnosis Information:  Diagnosis: S82.842A (ICD-10-CM) - Bimalleolar fracture, left, closed, initial encounter; 769 [id number] (ICD-10-CM) - Syndesmotic disruption of ankle, initial encounter  Treatment Diagnosis: L ankle pain, decreased mobility, decreased LE strength limiting gait  Insurance/Certification information:  PT Insurance Information: [facility] [employer] [facility]  Physician Information:  JUANPABLO Davis - CNP   Plan of care signed (Y/N): [x]  Yes -  []  No     Date of Patient follow up with Physician: PRN      Progress Report: [x]  Yes  []  No     Date Range for reporting period:  Beginnin22  PN: 22  POC: 22  Ending:     Progress report due (10 Rx/or 30 days whichever is less): visit #19 or 3/22/44 (date)     Recertification due (POC duration/ or 90 days whichever is less): visit #30 or 10/28/22 (date)     Visit # Insurance Allowable Auth required? Date Range   11 30 []  Yes  [x]  No n/a       Latex Allergy:  [x]NO      []YES  Preferred Language for Healthcare:   [x]English       []other:    Functional Scale:           Date assessed:  FOTO physical FS primary measure score = 26; risk adjusted = 49  22  FOTO physical FS primary measure score = 44     22  FOTO physical FS primary measure score = 60     22    Pain level:  0/10  Ankle       SUBJECTIVE:  Still doesn't feel comfortable with stair descent due to feeling weak and having some limited motion in ankle. Has plans to return to gym for workouts, does not plan on running/jumping, just wants to do a regular workout and start moving more.       21 L ankle ORIF, 22 hardware removal    OBJECTIVE:     Girth (cm) L - pre vaso / post verbal/tactile cueing for activities related to strengthening, flexibility, endurance, ROM for improvements in LE, proximal hip, and core control with self care, mobility, lifting, ambulation.  [] (97864) Provided verbal/tactile cueing for activities related to improving balance, coordination, kinesthetic sense, posture, motor skill, proprioception  to assist with LE, proximal hip, and core control in self care, mobility, lifting, ambulation and eccentric single leg control.   [] (81743) Therapist is in constant attendance of 2 or more patients providing skilled therapy interventions, but not providing any significant amount of measurable one-on-one time to either patient, for improvements in LE, proximal hip, and core control in self care, mobility, lifting, ambulation and eccentric single leg control.      NMR and Therapeutic Activities:    [x] (64311 or 68966) Provided verbal/tactile cueing for activities related to improving balance, coordination, kinesthetic sense, posture, motor skill, proprioception and motor activation to allow for proper function of core, proximal hip and LE with self care and ADLs  [] (31575) Gait Re-education- Provided training and instruction to the patient for proper LE, core and proximal hip recruitment and positioning and eccentric body weight control with ambulation re-education including up and down stairs     Home Exercise Program:    [x] (90332) Reviewed/Progressed HEP activities related to strengthening, flexibility, endurance, ROM of core, proximal hip and LE for functional self-care, mobility, lifting and ambulation/stair navigation   [] (69222)Reviewed/Progressed HEP activities related to improving balance, coordination, kinesthetic sense, posture, motor skill, proprioception of core, proximal hip and LE for self care, mobility, lifting, and ambulation/stair navigation      Manual Treatments:  PROM / STM / Oscillations-Mobs:  G-I, II, III, IV (PA's, Inf., Post.)  [x] (23372) Provided manual therapy to mobilize LE, proximal hip and/or LS spine soft tissue/joints for the purpose of modulating pain, promoting relaxation,  increasing ROM, reducing/eliminating soft tissue swelling/inflammation/restriction, improving soft tissue extensibility and allowing for proper ROM for normal function with self care, mobility, lifting and ambulation. Modalities:  [] (30068) Vasopneumatic compression: Utilized vasopneumatic compression to decrease edema / swelling for the purpose of improving mobility and quad tone / recruitment which will allow for increased overall function including but not limited to self-care, transfers, ambulation, and ascending / descending stairs. Charges:  Timed Code Treatment Minutes: 45   Total Treatment Minutes: 45     [] EVAL - LOW (57043)   [] EVAL - MOD (84913)  [] EVAL - HIGH (28363)  [] RE-EVAL (48702)  [x] AP(82346) x       [] Ionto   [x] NMR (91110) x 1      [] Vaso  [x] Manual (89905) x 1      [] Ultrasound  [] TA x        [] Mech Traction (27933)  [] Aquatic Therapy x     [] ES (un) (13251):   [] Home Management Training x  [] ES(attended) (28200)   [] Dry Needling 1-2 muscles (13107):  [] Dry Needling 3+ muscles (093592)  [] Group:      [] Other:     GOALS:   Patient stated goal: walk normal  [x] Progressing: [] Met: [] Not Met: [] Adjusted    Therapist goals for Patient:   Short Term Goals: To be achieved in: 2 weeks  1. Independent in HEP and progression per patient tolerance, in order to prevent re-injury. [x] Progressing: [] Met: [] Not Met: [] Adjusted  2. Patient will have a decrease in pain <3/10 to facilitate improvement in movement, function, and ADLs as indicated by Functional Deficits. [x] Progressing: [] Met: [] Not Met: [] Adjusted    Long Term Goals: To be achieved in: 10-12 weeks  1. FOTO score of at least 59 to assist with reaching prior level of function. [x] Progressing: [] Met: [] Not Met: [] Adjusted  2.  Patient will demonstrate increased AROM to at least L ankle DF 5, PF 40 to allow for proper joint functioning as indicated by patients Functional Deficits. [x] Progressing: [] Met: [] Not Met: [] Adjusted  3. Patient will demonstrate an increase in Strength to at least 4+/5 throughout as well as good proximal hip strength and control to allow for proper functional mobility as indicated by patients Functional Deficits. [x] Progressing: [] Met: [] Not Met: [] Adjusted  4. Patient will return to functional activities including walking on even ground with minimal deviations and no AD without increased symptoms or restriction. [x] Progressing: [] Met: [] Not Met: [] Adjusted  5. Patient will return to reciprocal stairs without increased symptoms. [x] Progressing: [] Met: [] Not Met: [] Adjusted     Overall Progression Towards Functional goals/ Treatment Progress Update:  [] Patient is progressing as expected towards functional goals listed. [] Progression is slowed due to complexities/Impairments listed. [] Progression has been slowed due to co-morbidities. [x] Plan just implemented, too soon to assess goals progression <30days   [] Goals require adjustment due to lack of progress  [] Patient is not progressing as expected and requires additional follow up with physician  [] Other    Persisting Functional Limitations/Impairments:  []Sitting [x]Standing   [x]Walking [x]Stairs   [x]Transfers [x]ADLs   [x]Squatting/bending []Kneeling  [x]Housework []Job related tasks  [x]Driving [x]Sports/Recreation   [x]Sleeping []Other:    ASSESSMENT: Pt demonstrates ankle muscle weakness with exercises this date with deficits in eccentric control. Fibrous tissue and tenderness noted with manual STM to plantar surface with tightness noted throughout ankle joint with joint mobilizations. Posterior joint mobilizations performed with tightness noted throughout rear foot.   Pt demonstrates limited balance with exercises noting difficulty with correcting losses of balance on dynamic surfaces due to limited joint mechanics and overall weakness in ankle. Continue with upgrades as tolerated to promote improved neuromuscular proprioception to decrease risk of future ankle injury as well as improve ankle mobility with skilled manual interventions for progression to performing stairs and dynamic tasks on uneven surfaces without restrictions. Treatment/Activity Tolerance:  [x] Pt able to complete treatment [] Patient limited by fatique  [] Patient limited by pain  [] Patient limited by other medical complications  [] Other:     Prognosis:  [x] Good [] Fair  [] Poor    Patient Requires Follow-up: [x] Yes  [] No    Return to Play:    [x]  N/A   []  Stage 1: Intro to Strength   []  Stage 2: Return to Run and Strength   []  Stage 3: Return to Jump and Strength   []  Stage 4: Dynamic Strength and Agility   []  Stage 5: Sport Specific Training   []  Ready to Return to Play, Meets All Above Stages   []  Not Ready for Return to Sports   Comments:            PLAN: See eval. PT 1-2x / week for 10-12 weeks. [x] Continue per plan of care [] Alter current plan (see comments)  [] Plan of care initiated [] Hold pending MD visit [] Discharge    Electronically signed by: Miky Paulino, PTA [id number]      Note: If patient does not return for scheduled/ recommended follow up visits, this note will serve as a discharge from care along with most recent update on progress.

## 2022-08-11 ENCOUNTER — HOSPITAL ENCOUNTER (OUTPATIENT)
Dept: PHYSICAL THERAPY | Age: 32
Setting detail: THERAPIES SERIES
Discharge: HOME | End: 2022-08-11
Payer: COMMERCIAL

## 2022-08-11 PROCEDURE — 97110 THERAPEUTIC EXERCISES: CPT

## 2022-08-11 PROCEDURE — 97140 MANUAL THERAPY 1/> REGIONS: CPT

## 2022-08-11 PROCEDURE — 97530 THERAPEUTIC ACTIVITIES: CPT

## 2022-08-11 NOTE — FLOWSHEET NOTE
[address] [facility]  Phone: [phone]   Fax: [phone]     Physical Therapy Treatment Note/ Progress Report:     Date:  2022    Patient Name:  Leonid Payne    :  1990  MRN: [id number]  Restrictions/Precautions:    Medical/Treatment Diagnosis Information:  Diagnosis: S82.842A (ICD-10-CM) - Bimalleolar fracture, left, closed, initial encounter; 769 5523 (ICD-10-CM) - Syndesmotic disruption of ankle, initial encounter  Treatment Diagnosis: L ankle pain, decreased mobility, decreased LE strength limiting gait  Insurance/Certification information:  PT Insurance Information: [facility] Choice Plus  Physician Information:  JUANPABLO Marvin - CNP   Plan of care signed (Y/N): [x]  Yes -  []  No     Date of Patient follow up with Physician: PRN      Progress Report: [x]  Yes  []  No     Date Range for reporting period:  Beginnin22  PN: 22  POC: 22  Ending:     Progress report due (10 Rx/or 30 days whichever is less): visit #19 or  (date)     Recertification due (POC duration/ or 90 days whichever is less): visit #30 or 10/28/22 (date)     Visit # Insurance Allowable Auth required? Date Range   12 30 []  Yes  [x]  No n/a       Latex Allergy:  [x]NO      []YES  Preferred Language for Healthcare:   [x]English       []other:    Functional Scale:           Date assessed:  FOTO physical FS primary measure score = 26; risk adjusted = 49  22  FOTO physical FS primary measure score = 44     22  FOTO physical FS primary measure score = 60     22    Pain level:  1/10  Ankle       SUBJECTIVE:  Reports being a little more sore after last visit but no pain. Reports being a little sore today. Reports feeling like her L ankle rolls in some times.        21 L ankle ORIF, 22 hardware removal    OBJECTIVE:     Girth (cm) L - pre vaso / post vaso R - pre-vaso / post-vaso   Figure 8 55.0 53.0          :  Gait Analysis: no heel strike L, decreased push off on L, decreased L stance time      PROM AROM     L R L R   Knee Flexion     [facility]   Knee [facility]     [facility]   Dorsiflexion      11 deg 5   Plantarflexion      64 deg 40   Inversion      32 30   Eversion      42 26     Strength (0-5) / Myotomes Left Right   Hip Flexion - seated (L1-2) 4 4+   Hip Abduction 4 4   Quads (L2-4) 4 5   Hamstrings 4 4+   Ankle Dorsiflexion (L4-5) 4+ 4+   Ankle Plantarflexion (S1-2) 4+ 4+   Ankle Inversion 4 4+   Ankle Eversion (S1-2) 4 4+   Great Toe Extension (L5)               Girth (cm)       Figure 8       Balance:  SLS: L: 10+ sec R: >15sec w/ ankle strategy    RESTRICTIONS/PRECAUTIONS: 11/22/21 L ankle ORIF, 4/7/22 hardware removal    No WB or ROM restrictions at this time    Exercises/Interventions:     Therapeutic Exercise (06320)  Resistance / level Sets/sec Reps Notes / Cues   Recumbent bike  5'     IB gastroc stretch  30\" 2    IB soleus stretch  30\" 2 6/10: unilateral, comments of clicking in anterior ankle   Half roll 1 20 ^5/17    Added 5/11   Npv?                            Therapeutic Activities (03488)       Forward step ups and over  6\" step 2 10 5/17   DL 80#    yellow       6/8   Neuromuscular Re-ed (41311)       Ball Maze PS L10  L 6 22 sec, 16 sec   For improved proprioception and balance  Upgrade program next visit    SL with UE perturbation Airex foam 1 10 arm swings each plane    Tandem Balance  30\" 2 B    Airex:  Narrow DAVEY EC  Staggered EC    30\"   1    BOSU Step up  2 10 L    Ant tap down 4\" 2 10 L Upgrade level next visit    Resisted side stepping Blue at ankle 20 ft 2 laps    Lateral step downs 4\" 2 10 Tactile and verbal cueing to limit L knee valgus   Bosu squats  2 10 8/11   Bosu lunges bilateral 1 10 8/11                 Manual Intervention (81203)       Tib/fib, talocrural Mobs  3'     Ankle mobs/PROM  6'                                     Modalities:   Pt. Education:  -pt educated on diagnosis, prognosis and expectations for rehab  -all pt questions were answered  -5/19 pt. To wean from crutch in the home, continue to use outside of the home    Home Exercise Program:  Access Code: [email or web address]  URL: [employer] Mobile/  Date: 05/06/2022  Prepared by: Salomon Martino    Exercises  Seated Ankle Alphabet - 2 x daily - 7 x weekly - 2 reps  Long Sitting Calf Stretch with Strap - 2 x daily - 7 x weekly - 3 reps - 20s hold  Seated Soleus Towel Stretch - 2 x daily - 7 x weekly - 3 sets - 10 reps  Seated Heel Toe Raises - 2 x daily - 7 x weekly - 2 sets - 10 reps  Towel Scrunches - 2 x daily - 7 x weekly - 2 sets - 10 reps  Ankle Inversion Eversion Towel Slide - 2 x daily - 7 x weekly - 2 sets - 10 reps  Straight Leg Raise - 2 x daily - 7 x weekly - 2 sets - 10 reps    6/10:  Access Code: [email or web address]  URL: [employer] Mobile/  Date: 06/10/2022  Prepared by: Lubna Hicks    Exercises  Seated Ankle Alphabet - 1 x daily - 7 x weekly - 2 reps  Towel Scrunches - 1 x daily - 7 x weekly - 2 sets - 10 reps  Ankle Inversion Eversion Towel Slide - 1 x daily - 7 x weekly - 2 sets - 10 reps  Straight Leg Raise - 1 x daily - 7 x weekly - 2 sets - 10 reps  Standing Gastroc Stretch at Counter - 1 x daily - 7 x weekly - 2 sets - 30 reps  Standing Soleus Stretch at Counter - 1 x daily - 7 x weekly - 2 sets - 30 reps  Hip Flexor Stretch on Step - 1 x daily - 7 x weekly - 2 sets - 30 reps  Heel rises with counter support - 1 x daily - 7 x weekly - 2 sets - 10 reps  Standing Tandem Balance with Counter Support - 1 x daily - 7 x weekly - 2 sets - 30 reps  Ankle Dorsiflexion with Resistance - 1 x daily - 7 x weekly - 2 sets - 10 reps  Ankle and Toe Plantarflexion with Resistance - 1 x daily - 7 x weekly - 2 sets - 10 reps  Ankle Eversion with Resistance - 1 x daily - 7 x weekly - 2 sets - 10 reps  Ankle Inversion with Resistance - 1 x daily - 7 x weekly - 2 sets - 10 reps      Therapeutic Exercise and NMR EXR  [x] (69814) Provided verbal/tactile cueing for activities related to strengthening, flexibility, endurance, ROM for improvements in LE, proximal hip, and core control with self care, mobility, lifting, ambulation.  [] (19786) Provided verbal/tactile cueing for activities related to improving balance, coordination, kinesthetic sense, posture, motor skill, proprioception  to assist with LE, proximal hip, and core control in self care, mobility, lifting, ambulation and eccentric single leg control.   [] (81550) Therapist is in constant attendance of 2 or more patients providing skilled therapy interventions, but not providing any significant amount of measurable one-on-one time to either patient, for improvements in LE, proximal hip, and core control in self care, mobility, lifting, ambulation and eccentric single leg control.      NMR and Therapeutic Activities:    [x] (17990 or 75161) Provided verbal/tactile cueing for activities related to improving balance, coordination, kinesthetic sense, posture, motor skill, proprioception and motor activation to allow for proper function of core, proximal hip and LE with self care and ADLs  [] (26458) Gait Re-education- Provided training and instruction to the patient for proper LE, core and proximal hip recruitment and positioning and eccentric body weight control with ambulation re-education including up and down stairs     Home Exercise Program:    [x] (34331) Reviewed/Progressed HEP activities related to strengthening, flexibility, endurance, ROM of core, proximal hip and LE for functional self-care, mobility, lifting and ambulation/stair navigation   [] (21605)Reviewed/Progressed HEP activities related to improving balance, coordination, kinesthetic sense, posture, motor skill, proprioception of core, proximal hip and LE for self care, mobility, lifting, and ambulation/stair navigation      Manual Treatments:  PROM / STM / Oscillations-Mobs:  G-I, II, III, IV (PA's, Inf., Post.)  [x] (45571) Provided manual therapy to mobilize LE, proximal hip and/or LS spine soft tissue/joints for the purpose of modulating pain, promoting relaxation,  increasing ROM, reducing/eliminating soft tissue swelling/inflammation/restriction, improving soft tissue extensibility and allowing for proper ROM for normal function with self care, mobility, lifting and ambulation. Modalities:  [] (82843) Vasopneumatic compression: Utilized vasopneumatic compression to decrease edema / swelling for the purpose of improving mobility and quad tone / recruitment which will allow for increased overall function including but not limited to self-care, transfers, ambulation, and ascending / descending stairs. Charges:  Timed Code Treatment Minutes: 45   Total Treatment Minutes: 45     [] EVAL - LOW (50157)   [] EVAL - MOD (45876)  [] EVAL - HIGH (72475)  [] RE-EVAL (25540)  [x] IF(89381) x  1     [] Ionto   [x] NMR (99807) x 1      [] Vaso  [x] Manual (29752) x 1      [] Ultrasound  [] TA x        [] Mech Traction (16587)  [] Aquatic Therapy x     [] ES (un) (46327):   [] Home Management Training x  [] ES(attended) (08831)   [] Dry Needling 1-2 muscles (36856):  [] Dry Needling 3+ muscles (887420)  [] Group:      [] Other:     GOALS:   Patient stated goal: walk normal  [x] Progressing: [] Met: [] Not Met: [] Adjusted    Therapist goals for Patient:   Short Term Goals: To be achieved in: 2 weeks  1. Independent in HEP and progression per patient tolerance, in order to prevent re-injury. [x] Progressing: [] Met: [] Not Met: [] Adjusted  2. Patient will have a decrease in pain <3/10 to facilitate improvement in movement, function, and ADLs as indicated by Functional Deficits. [x] Progressing: [] Met: [] Not Met: [] Adjusted    Long Term Goals: To be achieved in: 10-12 weeks  1. FOTO score of at least 59 to assist with reaching prior level of function. [x] Progressing: [] Met: [] Not Met: [] Adjusted  2.  Patient will demonstrate increased AROM to continues to have fibrous tissue throughout medial plantar surface as well as tenderness along medial malleolus with IASTM this date. Upgrades to program this date as noted in bold on flow sheet with focus on improving dynamic ankle stability and strength to decrease risk of rolling ankle and potential re injury to ankle with progression toward tolerance of stair descent and performing daily functional tasks without difficulty and restrictions. Treatment/Activity Tolerance:  [x] Pt able to complete treatment [] Patient limited by fatique  [] Patient limited by pain  [] Patient limited by other medical complications  [] Other:     Prognosis:  [x] Good [] Fair  [] Poor    Patient Requires Follow-up: [x] Yes  [] No    Return to Play:    [x]  N/A   []  Stage 1: Intro to Strength   []  Stage 2: Return to Run and Strength   []  Stage 3: Return to Jump and Strength   []  Stage 4: Dynamic Strength and Agility   []  Stage 5: Sport Specific Training   []  Ready to Return to Play, Meets All Above Stages   []  Not Ready for Return to Sports   Comments:            PLAN: See eval. PT 1-2x / week for 10-12 weeks. [x] Continue per plan of care [] Alter current plan (see comments)  [] Plan of care initiated [] Hold pending MD visit [] Discharge    Electronically signed by: Nancie Polanco, PTA [id number]      Note: If patient does not return for scheduled/ recommended follow up visits, this note will serve as a discharge from care along with most recent update on progress.

## 2022-08-16 ENCOUNTER — HOSPITAL ENCOUNTER (OUTPATIENT)
Dept: PHYSICAL THERAPY | Age: 32
Setting detail: THERAPIES SERIES
Discharge: HOME | End: 2022-08-16
Payer: COMMERCIAL

## 2022-08-16 PROCEDURE — 97110 THERAPEUTIC EXERCISES: CPT

## 2022-08-16 PROCEDURE — 97140 MANUAL THERAPY 1/> REGIONS: CPT

## 2022-08-16 PROCEDURE — 97112 NEUROMUSCULAR REEDUCATION: CPT

## 2022-08-16 NOTE — FLOWSHEET NOTE
[address] [facility]  Phone: [phone]   Fax: [phone]     Physical Therapy Treatment Note/ Progress Report:     Date:  2022    Patient Name:  Yazan Alfonso    :  1990  MRN: [id number]  Restrictions/Precautions:    Medical/Treatment Diagnosis Information:  Diagnosis: S82.842A (ICD-10-CM) - Bimalleolar fracture, left, closed, initial encounter; 769 5523 (ICD-10-CM) - Syndesmotic disruption of ankle, initial encounter  Treatment Diagnosis: L ankle pain, decreased mobility, decreased LE strength limiting gait  Insurance/Certification information:  PT Insurance Information: [facility] Choice Plus  Physician Information:  JUANPABLO Ornelas - CNP   Plan of care signed (Y/N): [x]  Yes -  []  No     Date of Patient follow up with Physician: PRN      Progress Report: [x]  Yes  []  No     Date Range for reporting period:  Beginnin22  PN: 22  POC: 22  Ending:     Progress report due (10 Rx/or 30 days whichever is less): visit #19 or  (date)     Recertification due (POC duration/ or 90 days whichever is less): visit #30 or 10/28/22 (date)     Visit # Insurance Allowable Auth required? Date Range   12 30 []  Yes  [x]  No n/a       Latex Allergy:  [x]NO      []YES  Preferred Language for Healthcare:   [x]English       []other:    Functional Scale:           Date assessed:  FOTO physical FS primary measure score = 26; risk adjusted = 49  22  FOTO physical FS primary measure score = 44     22  FOTO physical FS primary measure score = 60     22    Pain level:  5/10  Ankle       SUBJECTIVE:  Reports she was sore after the scar massage last session.  She did a lot of yard work yesterday and is more sore today than usual.     21 L ankle ORIF, 22 hardware removal    OBJECTIVE:     Girth (cm) L - pre vaso / post vaso R - pre-vaso / post-vaso   Figure 8 55.0 53.0          :  Gait Analysis: no heel strike L, decreased push answered  -5/19 pt. To wean from crutch in the home, continue to use outside of the home    Home Exercise Program:  Access Code: [email or web address]  URL: [employer]/  Date: 05/06/2022  Prepared by: Tonia Liu    Exercises  Seated Ankle Alphabet - 2 x daily - 7 x weekly - 2 reps  Long Sitting Calf Stretch with Strap - 2 x daily - 7 x weekly - 3 reps - 20s hold  Seated Soleus Towel Stretch - 2 x daily - 7 x weekly - 3 sets - 10 reps  Seated Heel Toe Raises - 2 x daily - 7 x weekly - 2 sets - 10 reps  Towel Scrunches - 2 x daily - 7 x weekly - 2 sets - 10 reps  Ankle Inversion Eversion Towel Slide - 2 x daily - 7 x weekly - 2 sets - 10 reps  Straight Leg Raise - 2 x daily - 7 x weekly - 2 sets - 10 reps    6/10:  Access Code: [email or web address]  URL: [employer]/  Date: 06/10/2022  Prepared by: Yun Agee    Exercises  Seated Ankle Alphabet - 1 x daily - 7 x weekly - 2 reps  Towel Scrunches - 1 x daily - 7 x weekly - 2 sets - 10 reps  Ankle Inversion Eversion Towel Slide - 1 x daily - 7 x weekly - 2 sets - 10 reps  Straight Leg Raise - 1 x daily - 7 x weekly - 2 sets - 10 reps  Standing Gastroc Stretch at Counter - 1 x daily - 7 x weekly - 2 sets - 30 reps  Standing Soleus Stretch at Counter - 1 x daily - 7 x weekly - 2 sets - 30 reps  Hip Flexor Stretch on Step - 1 x daily - 7 x weekly - 2 sets - 30 reps  Heel rises with counter support - 1 x daily - 7 x weekly - 2 sets - 10 reps  Standing Tandem Balance with Counter Support - 1 x daily - 7 x weekly - 2 sets - 30 reps  Ankle Dorsiflexion with Resistance - 1 x daily - 7 x weekly - 2 sets - 10 reps  Ankle and Toe Plantarflexion with Resistance - 1 x daily - 7 x weekly - 2 sets - 10 reps  Ankle Eversion with Resistance - 1 x daily - 7 x weekly - 2 sets - 10 reps  Ankle Inversion with Resistance - 1 x daily - 7 x weekly - 2 sets - 10 reps      Therapeutic Exercise and NMR EXR  [x] (13356) Provided verbal/tactile cueing for activities related to strengthening, flexibility, endurance, ROM for improvements in LE, proximal hip, and core control with self care, mobility, lifting, ambulation.  [] (81783) Provided verbal/tactile cueing for activities related to improving balance, coordination, kinesthetic sense, posture, motor skill, proprioception  to assist with LE, proximal hip, and core control in self care, mobility, lifting, ambulation and eccentric single leg control.   [] (84772) Therapist is in constant attendance of 2 or more patients providing skilled therapy interventions, but not providing any significant amount of measurable one-on-one time to either patient, for improvements in LE, proximal hip, and core control in self care, mobility, lifting, ambulation and eccentric single leg control.      NMR and Therapeutic Activities:    [x] (99451 or 86214) Provided verbal/tactile cueing for activities related to improving balance, coordination, kinesthetic sense, posture, motor skill, proprioception and motor activation to allow for proper function of core, proximal hip and LE with self care and ADLs  [] (77992) Gait Re-education- Provided training and instruction to the patient for proper LE, core and proximal hip recruitment and positioning and eccentric body weight control with ambulation re-education including up and down stairs     Home Exercise Program:    [x] (97042) Reviewed/Progressed HEP activities related to strengthening, flexibility, endurance, ROM of core, proximal hip and LE for functional self-care, mobility, lifting and ambulation/stair navigation   [] (72341)Reviewed/Progressed HEP activities related to improving balance, coordination, kinesthetic sense, posture, motor skill, proprioception of core, proximal hip and LE for self care, mobility, lifting, and ambulation/stair navigation      Manual Treatments:  PROM / STM / Oscillations-Mobs:  G-I, II, III, IV (PA's, Inf., Post.)  [x] (68704) Provided manual therapy to mobilize LE, proximal hip and/or LS spine soft tissue/joints for the purpose of modulating pain, promoting relaxation,  increasing ROM, reducing/eliminating soft tissue swelling/inflammation/restriction, improving soft tissue extensibility and allowing for proper ROM for normal function with self care, mobility, lifting and ambulation. Modalities:  [] (00911) Vasopneumatic compression: Utilized vasopneumatic compression to decrease edema / swelling for the purpose of improving mobility and quad tone / recruitment which will allow for increased overall function including but not limited to self-care, transfers, ambulation, and ascending / descending stairs. Charges:  Timed Code Treatment Minutes: 45   Total Treatment Minutes: 45     [] EVAL - LOW (86278)   [] EVAL - MOD (32154)  [] EVAL - HIGH (13762)  [] RE-EVAL (73936)  [x] LO(71090) x  1     [] Ionto   [x] NMR (29774) x 1      [] Vaso  [x] Manual (24059) x 1      [] Ultrasound  [] TA x        [] Mech Traction (32412)  [] Aquatic Therapy x     [] ES (un) (13713):   [] Home Management Training x  [] ES(attended) (51096)   [] Dry Needling 1-2 muscles (53537):  [] Dry Needling 3+ muscles (833136)  [] Group:      [] Other:     GOALS:   Patient stated goal: walk normal  [x] Progressing: [] Met: [] Not Met: [] Adjusted    Therapist goals for Patient:   Short Term Goals: To be achieved in: 2 weeks  1. Independent in HEP and progression per patient tolerance, in order to prevent re-injury. [x] Progressing: [] Met: [] Not Met: [] Adjusted  2. Patient will have a decrease in pain <3/10 to facilitate improvement in movement, function, and ADLs as indicated by Functional Deficits. [x] Progressing: [] Met: [] Not Met: [] Adjusted    Long Term Goals: To be achieved in: 10-12 weeks  1. FOTO score of at least 59 to assist with reaching prior level of function. [x] Progressing: [] Met: [] Not Met: [] Adjusted  2.  Patient will demonstrate increased AROM to at least L ankle DF 5, PF 40 to allow for proper joint functioning as indicated by patients Functional Deficits. [x] Progressing: [] Met: [] Not Met: [] Adjusted  3. Patient will demonstrate an increase in Strength to at least 4+/5 throughout as well as good proximal hip strength and control to allow for proper functional mobility as indicated by patients Functional Deficits. [x] Progressing: [] Met: [] Not Met: [] Adjusted  4. Patient will return to functional activities including walking on even ground with minimal deviations and no AD without increased symptoms or restriction. [x] Progressing: [] Met: [] Not Met: [] Adjusted  5. Patient will return to reciprocal stairs without increased symptoms. [x] Progressing: [] Met: [] Not Met: [] Adjusted     Overall Progression Towards Functional goals/ Treatment Progress Update:  [] Patient is progressing as expected towards functional goals listed. [] Progression is slowed due to complexities/Impairments listed. [] Progression has been slowed due to co-morbidities. [x] Plan just implemented, too soon to assess goals progression <30days   [] Goals require adjustment due to lack of progress  [] Patient is not progressing as expected and requires additional follow up with physician  [] Other    Persisting Functional Limitations/Impairments:  []Sitting [x]Standing   [x]Walking [x]Stairs   [x]Transfers [x]ADLs   [x]Squatting/bending []Kneeling  [x]Housework []Job related tasks  [x]Driving [x]Sports/Recreation   [x]Sleeping []Other:    ASSESSMENT:  Pt presents with significant increase in symptoms this date after performing yard work yesterday. She is able to tolerate above interventions, but does have increased symptoms with BOSU step up this date. Pt benefits from cueing to maintain arch doming with CKC activities. She continues to demonstrate limited proprioception and balance deficits with dynamic tasks needing finger touch assist for balance and support.  Continue to focus on improving dynamic ankle stability and strength to decrease risk of rolling ankle and potential re injury to ankle with progression toward tolerance of stair descent and performing daily functional tasks without difficulty and restrictions. Resume IASTM npv. Recommended ice for increased pain level. Pt is agreeable. Treatment/Activity Tolerance:  [x] Pt able to complete treatment [] Patient limited by fatique  [] Patient limited by pain  [] Patient limited by other medical complications  [] Other:     Prognosis:  [x] Good [] Fair  [] Poor    Patient Requires Follow-up: [x] Yes  [] No    Return to Play:    [x]  N/A   []  Stage 1: Intro to Strength   []  Stage 2: Return to Run and Strength   []  Stage 3: Return to Jump and Strength   []  Stage 4: Dynamic Strength and Agility   []  Stage 5: Sport Specific Training   []  Ready to Return to Play, Meets All Above Stages   []  Not Ready for Return to Sports   Comments:            PLAN: See eval. PT 1-2x / week for 10-12 weeks. [x] Continue per plan of care [] Alter current plan (see comments)  [] Plan of care initiated [] Hold pending MD visit [] Discharge    Electronically signed by: Tank Toscano, PT, DPT      Note: If patient does not return for scheduled/ recommended follow up visits, this note will serve as a discharge from care along with most recent update on progress.

## 2022-08-18 ENCOUNTER — APPOINTMENT (OUTPATIENT)
Dept: PHYSICAL THERAPY | Age: 32
End: 2022-08-18
Payer: COMMERCIAL

## 2022-08-23 ENCOUNTER — HOSPITAL ENCOUNTER (OUTPATIENT)
Dept: PHYSICAL THERAPY | Age: 32
Setting detail: THERAPIES SERIES
Discharge: HOME | End: 2022-08-23
Payer: COMMERCIAL

## 2022-08-23 PROCEDURE — 97530 THERAPEUTIC ACTIVITIES: CPT

## 2022-08-23 PROCEDURE — 97110 THERAPEUTIC EXERCISES: CPT

## 2022-08-23 PROCEDURE — 97112 NEUROMUSCULAR REEDUCATION: CPT

## 2022-08-23 NOTE — PLAN OF CARE
[address]  Phone: [phone]   Fax: [phone]   Physical Therapy Discharge Summary    Dear Arn Nyhan, APRN -*  ,    We had the pleasure of treating the following patient for physical therapy services at [facility]. A summary of our findings can be found in the discharge summary below. If you have any questions or concerns regarding these findings, please do not hesitate to contact me at the office phone number checked above. Thank you for the referral.     Physician Signature:________________________________Date:__________________  By signing above (or electronic signature), therapists plan is approved by physician      Functional Outcome:   FOTO physical FS primary measure score = 26; risk adjusted = 49  22  FOTO physical FS primary measure score = 44     22  FOTO physical FS primary measure score = 60     22  FOTO physical FS primary measure score = 67     22      Overall Response to Treatment:   [x]Patient is responding well to treatment and improvement is noted with regards  to goals   []Patient should continue to improve in reasonable time if they continue HEP   []Patient has plateaued and is no longer responding to skilled PT intervention    []Patient is getting worse and would benefit from return to referring MD   []Patient unable to adhere to initial POC   []Other:     Date range of Visits: 22-22  Total Visits: 12    Recommendation:    [x] Discharge to [facility]. Follow up with PT or MD PRN.            Physical Therapy Treatment Note/ Progress Report:     Date:  2022    Patient Name:  Shaquille Yang    :  1990  MRN: [id number]  Restrictions/Precautions:    Medical/Treatment Diagnosis Information:  Diagnosis: S82.842A (ICD-10-CM) - Bimalleolar fracture, left, closed, initial encounter; [id number] (ICD-10-CM) - Syndesmotic disruption of ankle, initial encounter  Treatment Diagnosis: L ankle pain, decreased mobility, decreased LE strength limiting gait  Insurance/Certification information:  PT Insurance Information: [facility] [employer] [facility]  Physician Information:  JUANPABLO Kidd - CNP   Plan of care signed (Y/N): [x]  Yes -  []  No     Date of Patient follow up with Physician: PRN      Progress Report: [x]  Yes  []  No     Date Range for reporting period:  Beginnin22  PN: 22  POC: 22  Endin22    Progress report due (10 Rx/or 30 days whichever is less): visit #19 or  (date)     Recertification due (POC duration/ or 90 days whichever is less): visit #30 or 10/28/22 (date)     Visit # Insurance Allowable Auth required? Date Range   12 30 []  Yes  [x]  No n/a       Latex Allergy:  [x]NO      []YES  Preferred Language for Healthcare:   [x]English       []other:    Functional Scale:           Date assessed:  FOTO physical FS primary measure score = 26; risk adjusted = 49  22  FOTO physical FS primary measure score = 44     22  FOTO physical FS primary measure score = 60     22  FOTO physical FS primary measure score = 67     22    Pain level:  0-1/10  Ankle       SUBJECTIVE:  Pt reports she is not having any pain today and has not since her last appt. In her daily life she is doing well. She is still a little hesitant with stair descent occasionally rotating her body to the side as she descends the stairs. She is doing her HEP consistently and knows she needs to continue to work on strength and balance.     21 L ankle ORIF, 22 hardware removal    OBJECTIVE:     Girth (cm) L - pre vaso / post vaso R - pre-vaso / post-vaso   Figure 8 55.0 53.0          :  Gait Analysis: no heel strike L, decreased push off on L, decreased L stance time      PROM AROM     L R L R   Knee Flexion     [facility]   Knee Extension     [facility]   Dorsiflexion      12 deg 5   Plantarflexion      64 deg 40   Inversion      32 30 Eversion      42 26     Strength (0-5) / Myotomes Left Right   Hip Flexion - seated (L1-2) 4 4+   Hip Abduction 4 4   Quads (L2-4) 4 5   Hamstrings 4 4+   Ankle Dorsiflexion (L4-5) 5 4+   Ankle Plantarflexion (S1-2) 4+ 4+   Ankle Inversion 4+ 4+   Ankle Eversion (S1-2) 4+ 4+   Great Toe Extension (L5)               Girth (cm)       Figure 8       Balance:  SLS: L: 15+ sec R: >15sec w/ ankle strategy    RESTRICTIONS/PRECAUTIONS: 11/22/21 L ankle ORIF, 4/7/22 hardware removal    No WB or ROM restrictions at this time    Exercises/Interventions:     Therapeutic Exercise (59858)  Resistance / level Sets/sec Reps Notes / Cues   Recumbent bike  5'     IB gastroc stretch  30\" 2    IB soleus stretch  30\" 2 6/10: unilateral, comments of clicking in anterior ankle   Standing HR/TR Half roll 1 20 ^5/17    Added 5/11   Npv? Therapeutic Activities (89163)       Forward step ups and over  6\" step 2 10 5/17   DL 80#    yellow       6/8   Reassessment of objective measures, updated HEP, self management techniques  10 min  8/23          Neuromuscular Re-ed (80573)       Kym Rubio  For improved proprioception and balance  Upgrade program next visit    SL with UE perturbation Airex foam 1 10 arm swings each plane    Tandem Balance     Airex:  Narrow DAVEY EC  Staggered EC    30\"   1    BOSU Step up  2 10 L    Ant tap down 4\" 2 10 L Upgrade level next visit    Resisted side stepping Blue at ankle 20 ft 2 laps    Lateral step downs 4\" 2 10 Tactile and verbal cueing to limit L knee valgus   Bosu squats  2 10 8/11   Bosu lunges bilateral 1 10 8/11                 Manual Intervention (55995)       Tib/fib, talocrural Mobs      Ankle mobs/PROM                                      Modalities:   Pt. Education:  -pt educated on diagnosis, prognosis and expectations for rehab  -all pt questions were answered  -5/19 pt.  To wean from crutch in the home, continue to use outside of the home    Home Exercise Program:  Access Code: [email or web address]  URL: G-CON/  Date: 05/06/2022  Prepared by: Renate Umaña    Exercises  Seated Ankle Alphabet - 2 x daily - 7 x weekly - 2 reps  Long Sitting Calf Stretch with Strap - 2 x daily - 7 x weekly - 3 reps - 20s hold  Seated Soleus Towel Stretch - 2 x daily - 7 x weekly - 3 sets - 10 reps  Seated Heel Toe Raises - 2 x daily - 7 x weekly - 2 sets - 10 reps  Towel Scrunches - 2 x daily - 7 x weekly - 2 sets - 10 reps  Ankle Inversion Eversion Towel Slide - 2 x daily - 7 x weekly - 2 sets - 10 reps  Straight Leg Raise - 2 x daily - 7 x weekly - 2 sets - 10 reps    6/10:  Access Code: [email or web address]  URL: G-CON/  Date: 06/10/2022  Prepared by: Chrystal Quiroz    Exercises  Seated Ankle Alphabet - 1 x daily - 7 x weekly - 2 reps  Towel Scrunches - 1 x daily - 7 x weekly - 2 sets - 10 reps  Ankle Inversion Eversion Towel Slide - 1 x daily - 7 x weekly - 2 sets - 10 reps  Straight Leg Raise - 1 x daily - 7 x weekly - 2 sets - 10 reps  Standing Gastroc Stretch at Counter - 1 x daily - 7 x weekly - 2 sets - 30 reps  Standing Soleus Stretch at Counter - 1 x daily - 7 x weekly - 2 sets - 30 reps  Hip Flexor Stretch on Step - 1 x daily - 7 x weekly - 2 sets - 30 reps  Heel rises with counter support - 1 x daily - 7 x weekly - 2 sets - 10 reps  Standing Tandem Balance with Counter Support - 1 x daily - 7 x weekly - 2 sets - 30 reps  Ankle Dorsiflexion with Resistance - 1 x daily - 7 x weekly - 2 sets - 10 reps  Ankle and Toe Plantarflexion with Resistance - 1 x daily - 7 x weekly - 2 sets - 10 reps  Ankle Eversion with Resistance - 1 x daily - 7 x weekly - 2 sets - 10 reps  Ankle Inversion with Resistance - 1 x daily - 7 x weekly - 2 sets - 10 reps    Access Code: [email or web address]  URL: G-CON/  Date: 08/23/2022  Prepared by: Chrystal Qurioz    Exercises  Standing Gastroc Stretch at Counter - 1 x daily - 7 x weekly - 2 sets - 30 reps  Standing Soleus Stretch at Counter - 1 x daily - 7 x weekly - 2 sets - 30 reps  Hip Flexor Stretch on Step - 1 x daily - 7 x weekly - 2 sets - 30 reps  Heel rises with counter support - 1 x daily - 7 x weekly - 2 sets - 10 reps  Single Leg Stance - 1 x daily - 7 x weekly - 3 reps - 30 hold  Ankle Dorsiflexion with Resistance - 1 x daily - 7 x weekly - 2 sets - 10 reps  Ankle and Toe Plantarflexion with Resistance - 1 x daily - 7 x weekly - 2 sets - 10 reps  Ankle Eversion with Resistance - 1 x daily - 7 x weekly - 2 sets - 10 reps  Ankle Inversion with Resistance - 1 x daily - 7 x weekly - 2 sets - 10 reps  Squat with Chair Touch - 1 x daily - 7 x weekly - 2 sets - 10 reps        Therapeutic Exercise and NMR EXR  [x] (94720) Provided verbal/tactile cueing for activities related to strengthening, flexibility, endurance, ROM for improvements in LE, proximal hip, and core control with self care, mobility, lifting, ambulation.  [] (34056) Provided verbal/tactile cueing for activities related to improving balance, coordination, kinesthetic sense, posture, motor skill, proprioception  to assist with LE, proximal hip, and core control in self care, mobility, lifting, ambulation and eccentric single leg control.   [] (39224) Therapist is in constant attendance of 2 or more patients providing skilled therapy interventions, but not providing any significant amount of measurable one-on-one time to either patient, for improvements in LE, proximal hip, and core control in self care, mobility, lifting, ambulation and eccentric single leg control.      NMR and Therapeutic Activities:    [x] (67289 or 69675) Provided verbal/tactile cueing for activities related to improving balance, coordination, kinesthetic sense, posture, motor skill, proprioception and motor activation to allow for proper function of core, proximal hip and LE with self care and ADLs  [] (90496) Gait Re-education- Provided training and instruction to the patient for proper LE, core and proximal hip recruitment and positioning and eccentric body weight control with ambulation re-education including up and down stairs     Home Exercise Program:    [x] (32446) Reviewed/Progressed HEP activities related to strengthening, flexibility, endurance, ROM of core, proximal hip and LE for functional self-care, mobility, lifting and ambulation/stair navigation   [] (17071)Reviewed/Progressed HEP activities related to improving balance, coordination, kinesthetic sense, posture, motor skill, proprioception of core, proximal hip and LE for self care, mobility, lifting, and ambulation/stair navigation      Manual Treatments:  PROM / STM / Oscillations-Mobs:  G-I, II, III, IV (PA's, Inf., Post.)  [x] (97278) Provided manual therapy to mobilize LE, proximal hip and/or LS spine soft tissue/joints for the purpose of modulating pain, promoting relaxation,  increasing ROM, reducing/eliminating soft tissue swelling/inflammation/restriction, improving soft tissue extensibility and allowing for proper ROM for normal function with self care, mobility, lifting and ambulation. Modalities:  [] (87078) Vasopneumatic compression: Utilized vasopneumatic compression to decrease edema / swelling for the purpose of improving mobility and quad tone / recruitment which will allow for increased overall function including but not limited to self-care, transfers, ambulation, and ascending / descending stairs.        Charges:  Timed Code Treatment Minutes: 45   Total Treatment Minutes: 45     [] EVAL - LOW (03032)   [] EVAL - MOD (20555)  [] EVAL - HIGH (47763)  [] RE-EVAL (91960)  [x] OQ(21186) x  1     [] Ionto   [x] NMR (57311) x 1      [] Vaso  [] Manual (02855) x 1      [] Ultrasound  [x] TA x   1     [] Mech Traction (24280)  [] Aquatic Therapy x     [] ES (un) (53767):   [] Home Management Training x  [] ES(attended) (43818)   [] Dry Needling 1-2 muscles (27518):  [] Dry Needling 3+ muscles (966880)  [] Group:      [] Other:     GOALS:   Patient stated goal: walk normal  [x] Progressing: [] Met: [] Not Met: [] Adjusted    Therapist goals for Patient:   Short Term Goals: To be achieved in: 2 weeks  1. Independent in HEP and progression per patient tolerance, in order to prevent re-injury. [] Progressing: [x] Met: [] Not Met: [] Adjusted  2. Patient will have a decrease in pain <3/10 to facilitate improvement in movement, function, and ADLs as indicated by Functional Deficits. [] Progressing: [x] Met: [] Not Met: [] Adjusted    Long Term Goals: To be achieved in: 10-12 weeks  1. FOTO score of at least 59 to assist with reaching prior level of function. [] Progressing: [x] Met: [] Not Met: [] Adjusted  2. Patient will demonstrate increased AROM to at least L ankle DF 5, PF 40 to allow for proper joint functioning as indicated by patients Functional Deficits. [] Progressing: [x] Met: [] Not Met: [] Adjusted  3. Patient will demonstrate an increase in Strength to at least 4+/5 throughout as well as good proximal hip strength and control to allow for proper functional mobility as indicated by patients Functional Deficits. [] Progressing: [x] Met: [] Not Met: [] Adjusted  4. Patient will return to functional activities including walking on even ground with minimal deviations and no AD without increased symptoms or restriction. [] Progressing: [x] Met: [] Not Met: [] Adjusted  5. Patient will return to reciprocal stairs without increased symptoms. [] Progressing: [x] Met: [] Not Met: [] Adjusted     Overall Progression Towards Functional goals/ Treatment Progress Update:  [] Patient is progressing as expected towards functional goals listed. [] Progression is slowed due to complexities/Impairments listed. [] Progression has been slowed due to co-morbidities.   [x] Plan just implemented, too soon to assess goals progression <30days   [] Goals require adjustment due to lack of progress  [] Patient is not progressing as expected and requires additional follow up with physician  [] Other    Persisting Functional Limitations/Impairments:  []Sitting [x]Standing   [x]Walking [x]Stairs   [x]Transfers [x]ADLs   [x]Squatting/bending []Kneeling  [x]Housework []Job related tasks  [x]Driving [x]Sports/Recreation   [x]Sleeping []Other:    ASSESSMENT:  Pt is a 27 yo female referred to PT s/p hardware removal occurring 4/7/22 following L ankle ORIF 11/22/21. She has been seen for 12 visits in PT. She presents with significant improvement in L ankle ROM, strength, balance, and gait pattern. She continues to present with deficits in ankle strength and tolerance for endurance activities (prolonged walking, yard work). She has met or is independently progressing with the above goals. We have a reviewed a compiled HEP this date with which pt demonstrates consistency and independence. She is appropriate for DC this date with continuation of her updated HEP. Treatment/Activity Tolerance:  [x] Pt able to complete treatment [] Patient limited by fatique  [] Patient limited by pain  [] Patient limited by other medical complications  [] Other:     Prognosis:  [x] Good [] Fair  [] Poor    Patient Requires Follow-up: [] Yes  [x] No    Return to Play:    [x]  N/A   []  Stage 1: Intro to Strength   []  Stage 2: Return to Run and Strength   []  Stage 3: Return to Jump and Strength   []  Stage 4: Dynamic Strength and Agility   []  Stage 5: Sport Specific Training   []  Ready to Return to Play, Meets All Above Stages   []  Not Ready for Return to Sports   Comments:            PLAN: See eval. PT 1-2x / week for 10-12 weeks.    [] Continue per plan of care [] Alter current plan (see comments)  [] Plan of care initiated [] Hold pending MD visit [x] Discharge    Electronically signed by: Denise Mcneal, PT, DPT      Note: If patient does not return for scheduled/ recommended follow up visits, this note will serve as a discharge from care along with most recent update on progress.

## 2022-12-22 ENCOUNTER — OFFICE VISIT (OUTPATIENT)
Dept: ORTHOPEDIC SURGERY | Age: 32
End: 2022-12-22

## 2022-12-22 VITALS — BODY MASS INDEX: 32.64 KG/M2 | HEIGHT: 70 IN | WEIGHT: 228 LBS

## 2022-12-22 DIAGNOSIS — S82.842A BIMALLEOLAR FRACTURE, LEFT, CLOSED, INITIAL ENCOUNTER: Primary | ICD-10-CM

## 2022-12-22 DIAGNOSIS — S93.439A SYNDESMOTIC DISRUPTION OF ANKLE, INITIAL ENCOUNTER: ICD-10-CM

## (undated) DEVICE — SUTURE MCRYL + SZ 4-0 L27IN ABSRB UD L19MM PS-2 3/8 CIR MCP426H

## (undated) DEVICE — GLOVE SURG SZ 65 L12IN THK75MIL DK GRN LTX FREE

## (undated) DEVICE — SYRINGE 60ML BULB IRRIG ST LF

## (undated) DEVICE — ELECTRODE,ECG,STRESS,FOAM,3PK: Brand: MEDLINE

## (undated) DEVICE — 3M™ STERI-STRIP™ COMPOUND BENZOIN TINCTURE 40 BAGS/CARTON 4 CARTONS/CASE C1544: Brand: 3M™ STERI-STRIP™

## (undated) DEVICE — PADDING CAST W4INXL4YD ST COT RAYON MICROPLEATED HIGHLY

## (undated) DEVICE — GLOVE SURG SZ 8 CRM LTX FREE POLYISOPRENE POLYMER BEAD ANTI

## (undated) DEVICE — FORCEPS BX L240CM JAW DIA2.8MM L CAP W/ NDL MIC MESH TOOTH

## (undated) DEVICE — COTTON UNDERCAST PADDING,CRIMPED FINISH: Brand: WEBRIL

## (undated) DEVICE — T-DRAPE,EXTREMITY,STERILE: Brand: MEDLINE

## (undated) DEVICE — Device

## (undated) DEVICE — BIT DRL L100MM DIA2.7MM QUIK CONN W/O STP N RADLUC REUSE

## (undated) DEVICE — MASC TURNOVER KIT: Brand: MEDLINE INDUSTRIES, INC.

## (undated) DEVICE — STRIP,CLOSURE,WOUND,MEDI-STRIP,1/2X4: Brand: MEDLINE

## (undated) DEVICE — GLOVE SURG 9 PF CRM STRL SENSICARE PI MIC LF

## (undated) DEVICE — GLOVE SURG SZ 65 THK91MIL LTX FREE SYN POLYISOPRENE

## (undated) DEVICE — GLOVE ORANGE PI 8   MSG9080

## (undated) DEVICE — SUTURE MCRYL SZ 4-0 L18IN ABSRB UD L19MM PS-2 3/8 CIR PRIM Y496G

## (undated) DEVICE — ZIMMER® STERILE DISPOSABLE TOURNIQUET CUFF WITH PLC, DUAL PORT, SINGLE BLADDER, 34 IN. (86 CM)

## (undated) DEVICE — GLOVE SURG SZ 65 L12IN FNGR THK79MIL GRN LTX FREE

## (undated) DEVICE — CONMED SCOPE SAVER BITE BLOCK, 20X27 MM: Brand: SCOPE SAVER

## (undated) DEVICE — BANDAGE COMPR W6INXL12FT SMOOTH FOR LIMB EXSANG ESMARCH

## (undated) DEVICE — INTENDED FOR TISSUE SEPARATION, AND OTHER PROCEDURES THAT REQUIRE A SHARP SURGICAL BLADE TO PUNCTURE OR CUT.: Brand: BARD-PARKER ® STAINLESS STEEL BLADES

## (undated) DEVICE — SUTURE VCRL + SZ 3-0 L18IN ABSRB UD SH 1/2 CIR TAPERCUT NDL VCP864D

## (undated) DEVICE — DECANTER BAG 9": Brand: MEDLINE INDUSTRIES, INC.

## (undated) DEVICE — ENDOSCOPIC KIT 2 12 FT OP4 DE2 GWN SYR

## (undated) DEVICE — MEDICINE CUP, GRADUATED, STER: Brand: MEDLINE

## (undated) DEVICE — APPLICATOR MEDICATED 26 CC SOLUTION HI LT ORNG CHLORAPREP

## (undated) DEVICE — SUTURE VCRL SZ 3-0 L18IN ABSRB UD L26MM SH 1/2 CIR J864D

## (undated) DEVICE — NEEDLE HYPO 22GA L1 1/2IN PIVOTING SHLD FOR LUERLOCK SYR

## (undated) DEVICE — TOWEL,OR,DSP,ST,BLUE,STD,4/PK,20PK/CS: Brand: MEDLINE

## (undated) DEVICE — HYPODERMIC SAFETY NEEDLE: Brand: MAGELLAN

## (undated) DEVICE — BIT DRL L100MM DIA2MM QUIK CONN W/O STP N RADLUC REUSE

## (undated) DEVICE — GLOVE ORTHO 8   MSG9480

## (undated) DEVICE — C-ARM: Brand: UNBRANDED

## (undated) DEVICE — GAUZE,SPONGE,4"X4",8PLY,STRL,LF,10/TRAY: Brand: MEDLINE

## (undated) DEVICE — DRESSING,GAUZE,XEROFORM,CURAD,1"X8",ST: Brand: CURAD

## (undated) DEVICE — ELECTRODE PT RET AD L9FT HI MOIST COND ADH HYDRGEL CORDED

## (undated) DEVICE — SOLUTION IRRIG 500ML 0.9% SOD CHL USP POUR PLAS BTL

## (undated) DEVICE — CANNULA NSL AD TBNG L7FT PVC STR NONFLARED PRNG O2 DEL W STD

## (undated) DEVICE — GOWN SIRUS NONREIN XL W/TWL: Brand: MEDLINE INDUSTRIES, INC.

## (undated) DEVICE — Z DISCONTINUED USE 2272117 DRAPE SURG 3 QTR N INVASIVE 2 LAYR DISP

## (undated) DEVICE — DRAPE,U/ SHT,SPLIT,PLAS,STERIL: Brand: MEDLINE

## (undated) DEVICE — Z CONVERTED USE 2273164 BANDAGE COMPR W4INXL4 1/2YD E EC SGL LAYERED CLP CLSR ECONO

## (undated) DEVICE — PACK PROCEDURE SURG EXTREMITY MFFOP CUST

## (undated) DEVICE — LOWER EXTREMITY: Brand: MEDLINE INDUSTRIES, INC.

## (undated) DEVICE — SUTURE VCRL SZ 2-0 L18IN ABSRB UD CT-1 L36MM 1/2 CIR J839D

## (undated) DEVICE — SOLUTION IV IRRIG POUR BRL 0.9% SODIUM CHL 2F7124

## (undated) DEVICE — BANDAGE COMPR W4INXL12FT E DISP ESMARCH EVEN

## (undated) DEVICE — BIT DRL L100MM DIA2.5MM FOR SM FRAG UNIV LOK SYS